# Patient Record
Sex: FEMALE | Race: WHITE | NOT HISPANIC OR LATINO
[De-identification: names, ages, dates, MRNs, and addresses within clinical notes are randomized per-mention and may not be internally consistent; named-entity substitution may affect disease eponyms.]

---

## 2018-06-13 ENCOUNTER — APPOINTMENT (OUTPATIENT)
Dept: ANTEPARTUM | Facility: CLINIC | Age: 38
End: 2018-06-13

## 2018-10-23 ENCOUNTER — INPATIENT (INPATIENT)
Facility: HOSPITAL | Age: 38
LOS: 1 days | Discharge: HOME | End: 2018-10-25
Attending: OBSTETRICS & GYNECOLOGY | Admitting: OBSTETRICS & GYNECOLOGY
Payer: COMMERCIAL

## 2018-10-23 ENCOUNTER — TRANSCRIPTION ENCOUNTER (OUTPATIENT)
Age: 38
End: 2018-10-23

## 2018-10-23 VITALS
RESPIRATION RATE: 20 BRPM | TEMPERATURE: 98 F | HEART RATE: 64 BPM | SYSTOLIC BLOOD PRESSURE: 118 MMHG | DIASTOLIC BLOOD PRESSURE: 66 MMHG

## 2018-10-23 DIAGNOSIS — Z3A.00 WEEKS OF GESTATION OF PREGNANCY NOT SPECIFIED: ICD-10-CM

## 2018-10-23 DIAGNOSIS — O26.899 OTHER SPECIFIED PREGNANCY RELATED CONDITIONS, UNSPECIFIED TRIMESTER: ICD-10-CM

## 2018-10-23 LAB
ANTIBODY ID 1_1: SIGNIFICANT CHANGE UP
BASOPHILS # BLD AUTO: 0.05 K/UL — SIGNIFICANT CHANGE UP (ref 0–0.2)
BASOPHILS NFR BLD AUTO: 0.4 % — SIGNIFICANT CHANGE UP (ref 0–2)
BLD GP AB SCN SERPL QL: POSITIVE — SIGNIFICANT CHANGE UP
DAT POLY-SP REAG RBC QL: NEGATIVE — SIGNIFICANT CHANGE UP
EOSINOPHIL # BLD AUTO: 0.01 K/UL — SIGNIFICANT CHANGE UP (ref 0–0.5)
EOSINOPHIL NFR BLD AUTO: 0.1 % — SIGNIFICANT CHANGE UP (ref 0–6)
HCT VFR BLD CALC: 38.2 % — SIGNIFICANT CHANGE UP (ref 34.5–45)
HGB BLD-MCNC: 12.6 G/DL — SIGNIFICANT CHANGE UP (ref 11.5–15.5)
IMM GRANULOCYTES # BLD AUTO: 0.06 # — SIGNIFICANT CHANGE UP
IMM GRANULOCYTES NFR BLD AUTO: 0.5 % — SIGNIFICANT CHANGE UP (ref 0–1.5)
LYMPHOCYTES # BLD AUTO: 1.01 K/UL — SIGNIFICANT CHANGE UP (ref 1–3.3)
LYMPHOCYTES # BLD AUTO: 8.8 % — LOW (ref 13–44)
MCHC RBC-ENTMCNC: 30 PG — SIGNIFICANT CHANGE UP (ref 27–34)
MCHC RBC-ENTMCNC: 33 % — SIGNIFICANT CHANGE UP (ref 32–36)
MCV RBC AUTO: 91 FL — SIGNIFICANT CHANGE UP (ref 80–100)
MONOCYTES # BLD AUTO: 0.82 K/UL — SIGNIFICANT CHANGE UP (ref 0–0.9)
MONOCYTES NFR BLD AUTO: 7.2 % — SIGNIFICANT CHANGE UP (ref 2–14)
NEUTROPHILS # BLD AUTO: 9.48 K/UL — HIGH (ref 1.8–7.4)
NEUTROPHILS NFR BLD AUTO: 83 % — HIGH (ref 43–77)
NRBC # FLD: 0 — SIGNIFICANT CHANGE UP
PLATELET # BLD AUTO: 289 K/UL — SIGNIFICANT CHANGE UP (ref 150–400)
PMV BLD: 10.6 FL — SIGNIFICANT CHANGE UP (ref 7–13)
RBC # BLD: 4.2 M/UL — SIGNIFICANT CHANGE UP (ref 3.8–5.2)
RBC # FLD: 14.1 % — SIGNIFICANT CHANGE UP (ref 10.3–14.5)
RH IG SCN BLD-IMP: NEGATIVE — SIGNIFICANT CHANGE UP
T PALLIDUM AB TITR SER: NEGATIVE — SIGNIFICANT CHANGE UP
WBC # BLD: 11.43 K/UL — HIGH (ref 3.8–10.5)
WBC # FLD AUTO: 11.43 K/UL — HIGH (ref 3.8–10.5)

## 2018-10-23 PROCEDURE — 86077 PHYS BLOOD BANK SERV XMATCH: CPT

## 2018-10-23 RX ORDER — MAGNESIUM HYDROXIDE 400 MG/1
30 TABLET, CHEWABLE ORAL
Qty: 0 | Refills: 0 | Status: DISCONTINUED | OUTPATIENT
Start: 2018-10-23 | End: 2018-10-25

## 2018-10-23 RX ORDER — PRAMOXINE HYDROCHLORIDE 150 MG/15G
1 AEROSOL, FOAM RECTAL EVERY 4 HOURS
Qty: 0 | Refills: 0 | Status: DISCONTINUED | OUTPATIENT
Start: 2018-10-23 | End: 2018-10-24

## 2018-10-23 RX ORDER — CITRIC ACID/SODIUM CITRATE 300-500 MG
15 SOLUTION, ORAL ORAL EVERY 4 HOURS
Qty: 0 | Refills: 0 | Status: DISCONTINUED | OUTPATIENT
Start: 2018-10-23 | End: 2018-10-23

## 2018-10-23 RX ORDER — ACETAMINOPHEN 500 MG
975 TABLET ORAL EVERY 6 HOURS
Qty: 0 | Refills: 0 | Status: COMPLETED | OUTPATIENT
Start: 2018-10-23 | End: 2019-09-21

## 2018-10-23 RX ORDER — HYDROCORTISONE 1 %
1 OINTMENT (GRAM) TOPICAL EVERY 4 HOURS
Qty: 0 | Refills: 0 | Status: DISCONTINUED | OUTPATIENT
Start: 2018-10-23 | End: 2018-10-24

## 2018-10-23 RX ORDER — PRAMOXINE HYDROCHLORIDE 150 MG/15G
1 AEROSOL, FOAM RECTAL EVERY 4 HOURS
Qty: 0 | Refills: 0 | Status: DISCONTINUED | OUTPATIENT
Start: 2018-10-23 | End: 2018-10-23

## 2018-10-23 RX ORDER — SODIUM CHLORIDE 9 MG/ML
3 INJECTION INTRAMUSCULAR; INTRAVENOUS; SUBCUTANEOUS EVERY 8 HOURS
Qty: 0 | Refills: 0 | Status: DISCONTINUED | OUTPATIENT
Start: 2018-10-23 | End: 2018-10-23

## 2018-10-23 RX ORDER — INFLUENZA VIRUS VACCINE 15; 15; 15; 15 UG/.5ML; UG/.5ML; UG/.5ML; UG/.5ML
0.5 SUSPENSION INTRAMUSCULAR ONCE
Qty: 0 | Refills: 0 | Status: DISCONTINUED | OUTPATIENT
Start: 2018-10-23 | End: 2018-10-25

## 2018-10-23 RX ORDER — OXYCODONE HYDROCHLORIDE 5 MG/1
5 TABLET ORAL EVERY 4 HOURS
Qty: 0 | Refills: 0 | Status: DISCONTINUED | OUTPATIENT
Start: 2018-10-23 | End: 2018-10-25

## 2018-10-23 RX ORDER — OXYCODONE HYDROCHLORIDE 5 MG/1
5 TABLET ORAL
Qty: 0 | Refills: 0 | Status: DISCONTINUED | OUTPATIENT
Start: 2018-10-23 | End: 2018-10-25

## 2018-10-23 RX ORDER — DIBUCAINE 1 %
1 OINTMENT (GRAM) RECTAL EVERY 4 HOURS
Qty: 0 | Refills: 0 | Status: DISCONTINUED | OUTPATIENT
Start: 2018-10-23 | End: 2018-10-23

## 2018-10-23 RX ORDER — OXYTOCIN 10 UNIT/ML
333.3 VIAL (ML) INJECTION
Qty: 20 | Refills: 0 | Status: COMPLETED | OUTPATIENT
Start: 2018-10-23

## 2018-10-23 RX ORDER — DOCUSATE SODIUM 100 MG
100 CAPSULE ORAL
Qty: 0 | Refills: 0 | Status: DISCONTINUED | OUTPATIENT
Start: 2018-10-23 | End: 2018-10-25

## 2018-10-23 RX ORDER — DIBUCAINE 1 %
1 OINTMENT (GRAM) RECTAL EVERY 4 HOURS
Qty: 0 | Refills: 0 | Status: DISCONTINUED | OUTPATIENT
Start: 2018-10-23 | End: 2018-10-25

## 2018-10-23 RX ORDER — GLYCERIN ADULT
1 SUPPOSITORY, RECTAL RECTAL AT BEDTIME
Qty: 0 | Refills: 0 | Status: DISCONTINUED | OUTPATIENT
Start: 2018-10-23 | End: 2018-10-25

## 2018-10-23 RX ORDER — SODIUM CHLORIDE 9 MG/ML
1000 INJECTION, SOLUTION INTRAVENOUS
Qty: 0 | Refills: 0 | Status: DISCONTINUED | OUTPATIENT
Start: 2018-10-23 | End: 2018-10-23

## 2018-10-23 RX ORDER — ACETAMINOPHEN 500 MG
3 TABLET ORAL
Qty: 0 | Refills: 0 | DISCHARGE
Start: 2018-10-23

## 2018-10-23 RX ORDER — OXYTOCIN 10 UNIT/ML
41.67 VIAL (ML) INJECTION
Qty: 20 | Refills: 0 | Status: DISCONTINUED | OUTPATIENT
Start: 2018-10-23 | End: 2018-10-23

## 2018-10-23 RX ORDER — OXYTOCIN 10 UNIT/ML
333.3 VIAL (ML) INJECTION
Qty: 20 | Refills: 0 | Status: COMPLETED | OUTPATIENT
Start: 2018-10-23 | End: 2018-10-23

## 2018-10-23 RX ORDER — LANOLIN
1 OINTMENT (GRAM) TOPICAL EVERY 6 HOURS
Qty: 0 | Refills: 0 | Status: DISCONTINUED | OUTPATIENT
Start: 2018-10-23 | End: 2018-10-25

## 2018-10-23 RX ORDER — ACETAMINOPHEN 500 MG
975 TABLET ORAL EVERY 6 HOURS
Qty: 0 | Refills: 0 | Status: DISCONTINUED | OUTPATIENT
Start: 2018-10-23 | End: 2018-10-25

## 2018-10-23 RX ORDER — IBUPROFEN 200 MG
600 TABLET ORAL EVERY 6 HOURS
Qty: 0 | Refills: 0 | Status: COMPLETED | OUTPATIENT
Start: 2018-10-23 | End: 2019-09-21

## 2018-10-23 RX ORDER — KETOROLAC TROMETHAMINE 30 MG/ML
30 SYRINGE (ML) INJECTION ONCE
Qty: 0 | Refills: 0 | Status: DISCONTINUED | OUTPATIENT
Start: 2018-10-23 | End: 2018-10-23

## 2018-10-23 RX ORDER — SODIUM CHLORIDE 9 MG/ML
3 INJECTION INTRAMUSCULAR; INTRAVENOUS; SUBCUTANEOUS EVERY 8 HOURS
Qty: 0 | Refills: 0 | Status: DISCONTINUED | OUTPATIENT
Start: 2018-10-23 | End: 2018-10-25

## 2018-10-23 RX ORDER — AER TRAVELER 0.5 G/1
1 SOLUTION RECTAL; TOPICAL EVERY 4 HOURS
Qty: 0 | Refills: 0 | Status: DISCONTINUED | OUTPATIENT
Start: 2018-10-23 | End: 2018-10-23

## 2018-10-23 RX ORDER — DIPHENHYDRAMINE HCL 50 MG
25 CAPSULE ORAL EVERY 6 HOURS
Qty: 0 | Refills: 0 | Status: DISCONTINUED | OUTPATIENT
Start: 2018-10-23 | End: 2018-10-25

## 2018-10-23 RX ORDER — SODIUM CHLORIDE 9 MG/ML
1000 INJECTION, SOLUTION INTRAVENOUS ONCE
Qty: 0 | Refills: 0 | Status: DISCONTINUED | OUTPATIENT
Start: 2018-10-23 | End: 2018-10-23

## 2018-10-23 RX ORDER — HYDROCORTISONE 1 %
1 OINTMENT (GRAM) TOPICAL EVERY 4 HOURS
Qty: 0 | Refills: 0 | Status: DISCONTINUED | OUTPATIENT
Start: 2018-10-23 | End: 2018-10-23

## 2018-10-23 RX ORDER — AER TRAVELER 0.5 G/1
1 SOLUTION RECTAL; TOPICAL EVERY 4 HOURS
Qty: 0 | Refills: 0 | Status: DISCONTINUED | OUTPATIENT
Start: 2018-10-23 | End: 2018-10-25

## 2018-10-23 RX ORDER — IBUPROFEN 200 MG
600 TABLET ORAL EVERY 6 HOURS
Qty: 0 | Refills: 0 | Status: DISCONTINUED | OUTPATIENT
Start: 2018-10-23 | End: 2018-10-25

## 2018-10-23 RX ORDER — TETANUS TOXOID, REDUCED DIPHTHERIA TOXOID AND ACELLULAR PERTUSSIS VACCINE, ADSORBED 5; 2.5; 8; 8; 2.5 [IU]/.5ML; [IU]/.5ML; UG/.5ML; UG/.5ML; UG/.5ML
0.5 SUSPENSION INTRAMUSCULAR ONCE
Qty: 0 | Refills: 0 | Status: DISCONTINUED | OUTPATIENT
Start: 2018-10-23 | End: 2018-10-25

## 2018-10-23 RX ORDER — SIMETHICONE 80 MG/1
80 TABLET, CHEWABLE ORAL EVERY 6 HOURS
Qty: 0 | Refills: 0 | Status: DISCONTINUED | OUTPATIENT
Start: 2018-10-23 | End: 2018-10-25

## 2018-10-23 RX ADMIN — Medication 975 MILLIGRAM(S): at 22:00

## 2018-10-23 RX ADMIN — Medication 600 MILLIGRAM(S): at 22:00

## 2018-10-23 RX ADMIN — Medication 999.9 MILLIUNIT(S)/MIN: at 15:03

## 2018-10-23 RX ADMIN — Medication 975 MILLIGRAM(S): at 21:00

## 2018-10-23 RX ADMIN — Medication 125 MILLIUNIT(S)/MIN: at 15:03

## 2018-10-23 RX ADMIN — Medication 975 MILLIGRAM(S): at 15:10

## 2018-10-23 RX ADMIN — Medication 600 MILLIGRAM(S): at 21:00

## 2018-10-23 RX ADMIN — SODIUM CHLORIDE 3 MILLILITER(S): 9 INJECTION INTRAMUSCULAR; INTRAVENOUS; SUBCUTANEOUS at 21:39

## 2018-10-23 RX ADMIN — Medication 30 MILLIGRAM(S): at 14:01

## 2018-10-23 NOTE — DISCHARGE NOTE OB - MEDICATION SUMMARY - MEDICATIONS TO TAKE
I will START or STAY ON the medications listed below when I get home from the hospital:    ibuprofen 600 mg oral tablet  -- 1 tab(s) by mouth every 6 hours, As needed, Moderate Pain  -- Indication: For pain    acetaminophen 325 mg oral tablet  -- 3 tab(s) by mouth every 6 hours  -- Indication: For pain

## 2018-10-23 NOTE — DISCHARGE NOTE OB - PATIENT PORTAL LINK FT
You can access the [email or web address] Patient [facility], offered by [facility], by registering with the following website: [email or web address]

## 2018-10-23 NOTE — DISCHARGE NOTE OB - CARE PLAN
Goal:	delivery  Assessment and plan of treatment:	f/u in 6 wks Principal Discharge DX:	Vaginal delivery  Goal:	delivery  Assessment and plan of treatment:	f/u in 6 wks

## 2018-10-24 LAB — RBC # BLD FETUS AUTO: 0 — SIGNIFICANT CHANGE UP

## 2018-10-24 RX ORDER — PRAMOXINE HYDROCHLORIDE 150 MG/15G
1 AEROSOL, FOAM RECTAL EVERY 4 HOURS
Qty: 0 | Refills: 0 | Status: DISCONTINUED | OUTPATIENT
Start: 2018-10-24 | End: 2018-10-25

## 2018-10-24 RX ORDER — VALACYCLOVIR 500 MG/1
2000 TABLET, FILM COATED ORAL EVERY 12 HOURS
Qty: 0 | Refills: 0 | Status: COMPLETED | OUTPATIENT
Start: 2018-10-24 | End: 2018-10-25

## 2018-10-24 RX ORDER — HYDROCORTISONE 1 %
1 OINTMENT (GRAM) TOPICAL EVERY 4 HOURS
Qty: 0 | Refills: 0 | Status: DISCONTINUED | OUTPATIENT
Start: 2018-10-24 | End: 2018-10-25

## 2018-10-24 RX ADMIN — Medication 100 MILLIGRAM(S): at 16:38

## 2018-10-24 RX ADMIN — Medication 975 MILLIGRAM(S): at 15:29

## 2018-10-24 RX ADMIN — Medication 975 MILLIGRAM(S): at 22:49

## 2018-10-24 RX ADMIN — Medication 975 MILLIGRAM(S): at 23:49

## 2018-10-24 RX ADMIN — Medication 975 MILLIGRAM(S): at 09:30

## 2018-10-24 RX ADMIN — Medication 975 MILLIGRAM(S): at 13:53

## 2018-10-24 RX ADMIN — Medication 975 MILLIGRAM(S): at 08:30

## 2018-10-24 RX ADMIN — VALACYCLOVIR 2000 MILLIGRAM(S): 500 TABLET, FILM COATED ORAL at 16:37

## 2018-10-24 RX ADMIN — SODIUM CHLORIDE 3 MILLILITER(S): 9 INJECTION INTRAMUSCULAR; INTRAVENOUS; SUBCUTANEOUS at 04:35

## 2018-10-24 RX ADMIN — Medication 600 MILLIGRAM(S): at 23:49

## 2018-10-24 RX ADMIN — Medication 1 APPLICATION(S): at 22:49

## 2018-10-24 RX ADMIN — Medication 600 MILLIGRAM(S): at 03:37

## 2018-10-24 RX ADMIN — Medication 975 MILLIGRAM(S): at 03:37

## 2018-10-24 RX ADMIN — Medication 600 MILLIGRAM(S): at 15:29

## 2018-10-24 RX ADMIN — Medication 600 MILLIGRAM(S): at 08:30

## 2018-10-24 RX ADMIN — Medication 1 APPLICATION(S): at 16:40

## 2018-10-24 RX ADMIN — Medication 975 MILLIGRAM(S): at 02:37

## 2018-10-24 RX ADMIN — Medication 600 MILLIGRAM(S): at 02:37

## 2018-10-24 RX ADMIN — Medication 600 MILLIGRAM(S): at 09:30

## 2018-10-24 RX ADMIN — Medication 600 MILLIGRAM(S): at 22:49

## 2018-10-24 RX ADMIN — Medication 600 MILLIGRAM(S): at 13:54

## 2018-10-24 NOTE — LACTATION INITIAL EVALUATION - INTERVENTION OUTCOME
verbalizes understanding/demonstrates understanding of teaching/good return demonstration/reviewed wet and soiled diaper log, feeding cues, feeding on demand, supply and demand, safe sleep practices, manual expression of colostrum;  encouraged to ask for assistance with breastfeeding as needed

## 2018-10-24 NOTE — LACTATION INITIAL EVALUATION - LACTATION INTERVENTIONS
initiate skin to skin/assisted to put baby to rt. breast in football hold position with deep latch and sucking with stimulation;  importance of deep latch stressed

## 2018-10-25 VITALS
RESPIRATION RATE: 17 BRPM | SYSTOLIC BLOOD PRESSURE: 135 MMHG | TEMPERATURE: 98 F | DIASTOLIC BLOOD PRESSURE: 81 MMHG | HEART RATE: 63 BPM | OXYGEN SATURATION: 98 %

## 2018-10-25 RX ADMIN — Medication 975 MILLIGRAM(S): at 06:01

## 2018-10-25 RX ADMIN — Medication 975 MILLIGRAM(S): at 13:05

## 2018-10-25 RX ADMIN — Medication 600 MILLIGRAM(S): at 13:05

## 2018-10-25 RX ADMIN — VALACYCLOVIR 2000 MILLIGRAM(S): 500 TABLET, FILM COATED ORAL at 06:01

## 2018-10-25 RX ADMIN — Medication 600 MILLIGRAM(S): at 13:35

## 2018-10-25 RX ADMIN — Medication 975 MILLIGRAM(S): at 07:01

## 2018-10-25 RX ADMIN — Medication 600 MILLIGRAM(S): at 06:01

## 2018-10-25 RX ADMIN — Medication 600 MILLIGRAM(S): at 07:00

## 2018-10-25 RX ADMIN — Medication 975 MILLIGRAM(S): at 13:35

## 2018-12-03 ENCOUNTER — APPOINTMENT (OUTPATIENT)
Dept: UROGYNECOLOGY | Facility: CLINIC | Age: 38
End: 2018-12-03
Payer: COMMERCIAL

## 2018-12-03 VITALS — DIASTOLIC BLOOD PRESSURE: 78 MMHG | HEIGHT: 66 IN | SYSTOLIC BLOOD PRESSURE: 120 MMHG

## 2018-12-03 DIAGNOSIS — R35.0 FREQUENCY OF MICTURITION: ICD-10-CM

## 2018-12-03 DIAGNOSIS — R35.1 NOCTURIA: ICD-10-CM

## 2018-12-03 DIAGNOSIS — R39.15 URGENCY OF URINATION: ICD-10-CM

## 2018-12-03 LAB
BILIRUB UR QL STRIP: NORMAL
CLARITY UR: CLEAR
COLLECTION METHOD: NORMAL
GLUCOSE UR-MCNC: NORMAL
HCG UR QL: 0.2 EU/DL
HGB UR QL STRIP.AUTO: NORMAL
KETONES UR-MCNC: NORMAL
LEUKOCYTE ESTERASE UR QL STRIP: NORMAL
NITRITE UR QL STRIP: NORMAL
PH UR STRIP: 7.5
PROT UR STRIP-MCNC: NORMAL
SP GR UR STRIP: 1.01

## 2018-12-03 PROCEDURE — 99203 OFFICE O/P NEW LOW 30 MIN: CPT

## 2018-12-03 PROCEDURE — 81003 URINALYSIS AUTO W/O SCOPE: CPT | Mod: QW

## 2019-01-03 ENCOUNTER — APPOINTMENT (OUTPATIENT)
Dept: UROGYNECOLOGY | Facility: CLINIC | Age: 39
End: 2019-01-03

## 2019-08-19 ENCOUNTER — RESULT REVIEW (OUTPATIENT)
Age: 39
End: 2019-08-19

## 2020-09-12 NOTE — PATIENT PROFILE OB - AMNIOTIC FLUID ODOR, LABOR
pt received into spot C A&Ox3 ambulatory appears comfortable arrives via walk in triage with family for eval of right hand/ wrist pain s/p [place] the park prior to [place]. Denies head trauma/ LOC no vomiting or other injuries. Appears slightly swollen but has ROM
normal

## 2020-11-30 ENCOUNTER — APPOINTMENT (OUTPATIENT)
Dept: SPINE | Facility: CLINIC | Age: 40
End: 2020-11-30
Payer: COMMERCIAL

## 2020-11-30 VITALS
TEMPERATURE: 98 F | SYSTOLIC BLOOD PRESSURE: 126 MMHG | WEIGHT: 150 LBS | HEIGHT: 66 IN | DIASTOLIC BLOOD PRESSURE: 75 MMHG | BODY MASS INDEX: 24.11 KG/M2

## 2020-11-30 PROCEDURE — 99203 OFFICE O/P NEW LOW 30 MIN: CPT

## 2020-11-30 RX ORDER — OMEGA-3/DHA/EPA/FISH OIL 300-1000MG
CAPSULE ORAL
Refills: 0 | Status: ACTIVE | COMMUNITY

## 2020-11-30 RX ORDER — CHOLECALCIFEROL (VITAMIN D3) 25 MCG
TABLET ORAL
Refills: 0 | Status: ACTIVE | COMMUNITY

## 2020-11-30 RX ORDER — METHENAM/BENZ AC/SALICY/SAL-AM
TABLET ORAL
Refills: 0 | Status: DISCONTINUED | COMMUNITY
End: 2020-11-30

## 2020-11-30 RX ORDER — ASCORBIC ACID 500 MG
TABLET ORAL
Refills: 0 | Status: ACTIVE | COMMUNITY

## 2020-11-30 RX ORDER — FLUOXETINE HYDROCHLORIDE 60 MG/1
TABLET ORAL
Refills: 0 | Status: ACTIVE | COMMUNITY

## 2020-11-30 RX ORDER — VALACYCLOVIR 500 MG/1
TABLET, FILM COATED ORAL
Refills: 0 | Status: ACTIVE | COMMUNITY

## 2020-11-30 NOTE — PHYSICAL EXAM
[Person] : oriented to person [Place] : oriented to place [Time] : oriented to time [Short Term Intact] : short term memory intact [Remote Intact] : remote memory intact [Span Intact] : the attention span was normal [Concentration Intact] : normal concentrating ability [Fluency] : fluency intact [Comprehension] : comprehension intact [Current Events] : adequate knowledge of current events [Past History] : adequate knowledge of personal past history [Vocabulary] : adequate range of vocabulary [Cranial Nerves Optic (II)] : visual acuity intact bilaterally,  pupils equal round and reactive to light [Cranial Nerves Oculomotor (III)] : extraocular motion intact [Cranial Nerves Trigeminal (V)] : facial sensation intact symmetrically [Cranial Nerves Facial (VII)] : face symmetrical [Cranial Nerves Vestibulocochlear (VIII)] : hearing was intact bilaterally [Cranial Nerves Glossopharyngeal (IX)] : tongue and palate midline [Cranial Nerves Accessory (XI - Cranial And Spinal)] : head turning and shoulder shrug symmetric [Cranial Nerves Hypoglossal (XII)] : there was no tongue deviation with protrusion [Motor Tone] : muscle tone was normal in all four extremities [Motor Strength] : muscle strength was normal in all four extremities [No Muscle Atrophy] : normal bulk in all four extremities [Sensation Tactile Decrease] : light touch was intact [Abnormal Walk] : normal gait [Balance] : balance was intact [Past-pointing] : there was no past-pointing [Tremor] : no tremor present [2+] : Ankle jerk left 2+ [Plantar Reflex Right Only] : normal on the right [Plantar Reflex Left Only] : normal on the left [___] : absent on the right [___] : absent on the left [Guillen] : Guillen's sign was not demonstrated [No Tenderness to Palpation] : no spine tenderness on palpation [Full ROM] : full ROM [L'Hermitte's] : neck flexion did not produce tingling down the spine/arms [Spurling's - Opposite Side] : Negative Spurling's on opposite side [Spurling's Same Side] : Negative Spurling's on same side

## 2020-11-30 NOTE — HISTORY OF PRESENT ILLNESS
[< 3 months] : less than 3 months [FreeTextEntry1] : Right sided radiculopathy  [de-identified] : this is a pleasant 40-year-old female with a long standing history of neck pain that was intermittent until about 2.5 months ago when she began to have right arm pain and neck pain .  the pain is reported at 8/10 and constant.  Her pain is in the shoulder as well.  The pain is tingling and numbness with all five fingers numb. N She is reporting weakness of her right arm.   No urine or stool incontinence.  She has taken Advil during the month with no relief.  Positive difficulty sleeping.  On MRI there is a disc herniation of C 6 C 7 and osteophytes at C 4-C6. .  The MRI scan did not perform any axial T2 imaging. [Pain] : pain [Weakness] : weakness [Numbness] : numbness [Stable] : stable [___ mths] : [unfilled] month(s) ago [7] : currently ~his/her~ pain is 7 out of 10

## 2020-11-30 NOTE — REVIEW OF SYSTEMS
[Arm Weakness] : arm weakness [Numbness] : numbness [Tingling] : tingling [Negative] : Heme/Lymph [de-identified] : neck and right arm pain

## 2020-11-30 NOTE — ASSESSMENT
[FreeTextEntry1] : Right sided cervical radiculopathy and paracentral disc herniations of C 6 C 7 and osteophytes at C 3 C 4 .    The MRI  has no axial images to make a determination of compression and [facility] radiology will be contacted.   A course of PT was recommended.   Alternative measures such as acupuncture and  epidural injections were suggested.  Chiropractor therapy was not recommended.  She will follow up if her pain is unrelenting.  In the meantime the MRI disc will be resent or repeated.  Telehealth can be scheduled for her MRI review.   \par \par \par Dr. Swenson \[facility] [address] [facility] \[place] [address]

## 2020-11-30 NOTE — CONSULT LETTER
[Dear  ___] : Dear  [unfilled], [Consult Letter:] : I had the pleasure of evaluating your patient, [unfilled]. [Please see my note below.] : Please see my note below. [Consult Closing:] : Thank you very much for allowing me to participate in the care of this patient.  If you have any questions, please do not hesitate to contact me. [Sincerely,] : Sincerely, [FreeTextEntry3] : Thor Guillen MD\par Chief of Neurosurgery [facility]\par [facility]\par

## 2020-11-30 NOTE — REASON FOR VISIT
[New Patient Visit] : a new patient visit [Other: _____] : [unfilled] [Referred By: _________] : Patient was referred by LYLY [FreeTextEntry1] : Cervical radiculopathy

## 2020-12-21 ENCOUNTER — APPOINTMENT (OUTPATIENT)
Dept: SPINE | Facility: CLINIC | Age: 40
End: 2020-12-21
Payer: COMMERCIAL

## 2020-12-21 VITALS
HEIGHT: 66 IN | WEIGHT: 150 LBS | SYSTOLIC BLOOD PRESSURE: 123 MMHG | HEART RATE: 69 BPM | OXYGEN SATURATION: 99 % | DIASTOLIC BLOOD PRESSURE: 81 MMHG | TEMPERATURE: 97.6 F | BODY MASS INDEX: 24.11 KG/M2

## 2020-12-21 PROCEDURE — 99213 OFFICE O/P EST LOW 20 MIN: CPT

## 2020-12-21 PROCEDURE — 99072 ADDL SUPL MATRL&STAF TM PHE: CPT

## 2020-12-21 NOTE — ASSESSMENT
[FreeTextEntry1] : It was discussed that the patient has herniated discs at C5-C6 and C6-C7.  Surgery for a C5-C6 and C6-C7 TDR was discussed with the patient.  The patient will call if she decides to have surgery.  She was  advised to have cervical AP and lateral x rays and a CT scan if she decides to have surgery.

## 2020-12-21 NOTE — REASON FOR VISIT
[Follow-Up: _____] : a [unfilled] follow-up visit [FreeTextEntry1] : SIERRA HOUSTON is a 40 year old lady who has been neck pain with pain, tingling and numbness down her right arm which started 2 months ago.  She state that her pain today is an 8 on a scale from 0-10.  She stated that the pain is better than when she was last here but she has more numbness in the right arm.  She stated that she has difficulty holding and taking care of her baby.MRI scan shows a large right C6-7 disc herniation and a smaller right C5-6 disc herniation. Her pain is significantly adversely affecting her lifestyle. She is also noting significant right arm and hand weakness.\par

## 2020-12-21 NOTE — PHYSICAL EXAM
[Sensation Tactile Decrease] : light touch was intact [Abnormal Walk] : normal gait [FreeTextEntry1] : The patient is alert and oriented to person, place and time.  Higher cortical functions are intact. Face is symmetrical and speech is clear and fluent. The patient has weakness with the  on the right side. Gait is normal.\par  [FreeTextEntry6] : right bicep, tricep and weakness 4/5

## 2020-12-28 ENCOUNTER — NON-APPOINTMENT (OUTPATIENT)
Age: 40
End: 2020-12-28

## 2021-01-05 ENCOUNTER — APPOINTMENT (OUTPATIENT)
Dept: RADIOLOGY | Facility: IMAGING CENTER | Age: 41
End: 2021-01-05

## 2021-01-05 ENCOUNTER — APPOINTMENT (OUTPATIENT)
Dept: CT IMAGING | Facility: IMAGING CENTER | Age: 41
End: 2021-01-05

## 2021-01-05 ENCOUNTER — OUTPATIENT (OUTPATIENT)
Dept: OUTPATIENT SERVICES | Facility: HOSPITAL | Age: 41
LOS: 1 days | End: 2021-01-05
Payer: COMMERCIAL

## 2021-01-05 DIAGNOSIS — M50.20 OTHER CERVICAL DISC DISPLACEMENT, UNSPECIFIED CERVICAL REGION: ICD-10-CM

## 2021-01-05 PROCEDURE — 72052 X-RAY EXAM NECK SPINE 6/>VWS: CPT

## 2021-01-05 PROCEDURE — 72125 CT NECK SPINE W/O DYE: CPT

## 2021-01-05 PROCEDURE — 72125 CT NECK SPINE W/O DYE: CPT | Mod: 26

## 2021-01-05 PROCEDURE — 72052 X-RAY EXAM NECK SPINE 6/>VWS: CPT | Mod: 26

## 2021-01-06 ENCOUNTER — NON-APPOINTMENT (OUTPATIENT)
Age: 41
End: 2021-01-06

## 2021-01-15 ENCOUNTER — OUTPATIENT (OUTPATIENT)
Dept: OUTPATIENT SERVICES | Facility: HOSPITAL | Age: 41
LOS: 1 days | End: 2021-01-15
Payer: COMMERCIAL

## 2021-01-15 VITALS
SYSTOLIC BLOOD PRESSURE: 106 MMHG | RESPIRATION RATE: 16 BRPM | WEIGHT: 145.95 LBS | HEIGHT: 66 IN | HEART RATE: 70 BPM | DIASTOLIC BLOOD PRESSURE: 69 MMHG | TEMPERATURE: 98 F | OXYGEN SATURATION: 99 %

## 2021-01-15 DIAGNOSIS — M54.12 RADICULOPATHY, CERVICAL REGION: ICD-10-CM

## 2021-01-15 DIAGNOSIS — Z29.9 ENCOUNTER FOR PROPHYLACTIC MEASURES, UNSPECIFIED: ICD-10-CM

## 2021-01-15 DIAGNOSIS — Z01.818 ENCOUNTER FOR OTHER PREPROCEDURAL EXAMINATION: ICD-10-CM

## 2021-01-15 DIAGNOSIS — Z98.890 OTHER SPECIFIED POSTPROCEDURAL STATES: Chronic | ICD-10-CM

## 2021-01-15 DIAGNOSIS — Z98.890 OTHER SPECIFIED POSTPROCEDURAL STATES: ICD-10-CM

## 2021-01-15 LAB
ANION GAP SERPL CALC-SCNC: 12 MMOL/L — SIGNIFICANT CHANGE UP (ref 5–17)
BUN SERPL-MCNC: 21 MG/DL — SIGNIFICANT CHANGE UP (ref 7–23)
CALCIUM SERPL-MCNC: 9.6 MG/DL — SIGNIFICANT CHANGE UP (ref 8.4–10.5)
CHLORIDE SERPL-SCNC: 102 MMOL/L — SIGNIFICANT CHANGE UP (ref 96–108)
CO2 SERPL-SCNC: 24 MMOL/L — SIGNIFICANT CHANGE UP (ref 22–31)
CREAT SERPL-MCNC: 0.85 MG/DL — SIGNIFICANT CHANGE UP (ref 0.5–1.3)
GLUCOSE SERPL-MCNC: 81 MG/DL — SIGNIFICANT CHANGE UP (ref 70–99)
HCT VFR BLD CALC: 40.1 % — SIGNIFICANT CHANGE UP (ref 34.5–45)
HGB BLD-MCNC: 13.4 G/DL — SIGNIFICANT CHANGE UP (ref 11.5–15.5)
MCHC RBC-ENTMCNC: 30.5 PG — SIGNIFICANT CHANGE UP (ref 27–34)
MCHC RBC-ENTMCNC: 33.4 GM/DL — SIGNIFICANT CHANGE UP (ref 32–36)
MCV RBC AUTO: 91.1 FL — SIGNIFICANT CHANGE UP (ref 80–100)
NRBC # BLD: 0 /100 WBCS — SIGNIFICANT CHANGE UP (ref 0–0)
PLATELET # BLD AUTO: 372 K/UL — SIGNIFICANT CHANGE UP (ref 150–400)
POTASSIUM SERPL-MCNC: 4.1 MMOL/L — SIGNIFICANT CHANGE UP (ref 3.5–5.3)
POTASSIUM SERPL-SCNC: 4.1 MMOL/L — SIGNIFICANT CHANGE UP (ref 3.5–5.3)
RBC # BLD: 4.4 M/UL — SIGNIFICANT CHANGE UP (ref 3.8–5.2)
RBC # FLD: 13.2 % — SIGNIFICANT CHANGE UP (ref 10.3–14.5)
SODIUM SERPL-SCNC: 138 MMOL/L — SIGNIFICANT CHANGE UP (ref 135–145)
WBC # BLD: 7.62 K/UL — SIGNIFICANT CHANGE UP (ref 3.8–10.5)
WBC # FLD AUTO: 7.62 K/UL — SIGNIFICANT CHANGE UP (ref 3.8–10.5)

## 2021-01-15 PROCEDURE — 86850 RBC ANTIBODY SCREEN: CPT

## 2021-01-15 PROCEDURE — G0463: CPT

## 2021-01-15 PROCEDURE — 86900 BLOOD TYPING SEROLOGIC ABO: CPT

## 2021-01-15 PROCEDURE — 86901 BLOOD TYPING SEROLOGIC RH(D): CPT

## 2021-01-15 NOTE — H&P PST ADULT - NSANTHOSAYNRD_GEN_A_CORE
No. ZEINA screening performed.  STOP BANG Legend: 0-2 = LOW Risk; 3-4 = INTERMEDIATE Risk; 5-8 = HIGH Risk

## 2021-01-15 NOTE — H&P PST ADULT - ASSESSMENT
[place]IAI VTE 2.0 SCORE [CLOT updated 2019]    AGE RELATED RISK FACTORS                                                       MOBILITY RELATED FACTORS  [x ] Age 41-60 years                                            (1 Point)                    [ ] Bed rest                                                        (1 Point)  [ ] Age: 61-74 years                                           (2 Points)                  [ ] Plaster cast                                                   (2 Points)  [ ] Age= 75 years                                              (3 Points)                    [ ] Bed bound for more than 72 hours                 (2 Points)    DISEASE RELATED RISK FACTORS                                               GENDER SPECIFIC FACTORS  [ ] Edema in the lower extremities                       (1 Point)              [ ] Pregnancy                                                     (1 Point)  [ ] Varicose veins                                               (1 Point)                     [ ] Post-partum < 6 weeks                                   (1 Point)             [ ] BMI > 25 Kg/m2                                            (1 Point)                     [ ] Hormonal therapy  or oral contraception          (1 Point)                 [ ] Sepsis (in the previous month)                        (1 Point)               [ ] History of pregnancy complications                 (1 point)  [ ] Pneumonia or serious lung disease                                               [ ] Unexplained or recurrent                     (1 Point)           (in the previous month)                               (1 Point)  [ ] Abnormal pulmonary function test                     (1 Point)                 SURGERY RELATED RISK FACTORS  [ ] Acute myocardial infarction                              (1 Point)               [ ]  Section                                             (1 Point)  [ ] Congestive heart failure (in the previous month)  (1 Point)      [ ] Minor surgery                                                  (1 Point)   [ ] Inflammatory bowel disease                             (1 Point)               [ ] Arthroscopic surgery                                        (2 Points)  [ ] Central venous access                                      (2 Points)                [x ] General surgery lasting more than 45 minutes (2 points)  [ ] Malignancy- Present or previous                   (2 Points)                [ ] Elective arthroplasty                                         (5 points)    [ ] Stroke (in the previous month)                          (5 Points)                                                                                                                                                           HEMATOLOGY RELATED FACTORS                                                 TRAUMA RELATED RISK FACTORS  [ ] Prior episodes of VTE                                     (3 Points)                [ ] Fracture of the hip, pelvis, or leg                       (5 Points)  [ ] Positive family history for VTE                         (3 Points)             [ ] Acute spinal cord injury (in the previous month)  (5 Points)  [ ] Prothrombin 06121 A                                     (3 Points)               [ ] Paralysis  (less than 1 month)                             (5 Points)  [ ] Factor V Leiden                                             (3 Points)                  [ ] Multiple Trauma within 1 month                        (5 Points)  [ ] Lupus anticoagulants                                     (3 Points)                                                           [ ] Anticardiolipin antibodies                               (3 Points)                                                       [ ] High homocysteine in the blood                      (3 Points)                                             [ ] Other congenital or acquired thrombophilia      (3 Points)                                                [ ] Heparin induced thrombocytopenia                  (3 Points)                                     Total Score [    3      ]

## 2021-01-15 NOTE — H&P PST ADULT - NSICDXPASTSURGICALHX_GEN_ALL_CORE_FT
PAST SURGICAL HISTORY:  No significant past surgical history      PAST SURGICAL HISTORY:  H/O oral surgery     Status post anal fissurectomy 2019

## 2021-01-15 NOTE — H&P PST ADULT - NSICDXPASTMEDICALHX_GEN_ALL_CORE_FT
PAST MEDICAL HISTORY:  Cervical herniated disc     Cervical radiculopathy     Depression     No pertinent past medical history      PAST MEDICAL HISTORY:  Cervical herniated disc     Cervical radiculopathy     Depression      PAST MEDICAL HISTORY:  Cervical herniated disc     Cervical radiculopathy     Depression     History of recent dental procedure Root canal on 1/12/21

## 2021-01-15 NOTE — H&P PST ADULT - HISTORY OF PRESENT ILLNESS
41 y/o female with history of cervical radiculopathy presents today for presurgical evaluation.  She is scheduled for C5-6 and C6-7 anterior total disc replacement on 1/29/21.    Preop Covid swab is scheduled for 1/26/21 at [facility] testing site.  39 y/o female with history of cervical radiculopathy presents today for presurgical evaluation.  She reports history of chronic intermittent neck pain, however she reports that the neck pain and right arm, pain, numbness, tingling and weakness have worsened over the past 2.5 months.  She is scheduled for C5-6 and C6-7 anterior total disc replacement on 1/29/21.    Preop Covid swab is scheduled for 1/26/21 at [facility] testing site.

## 2021-01-15 NOTE — H&P PST ADULT - NSICDXPROBLEM_GEN_ALL_CORE_FT
PROBLEM DIAGNOSES  Problem: Cervical radiculopathy  Assessment and Plan: Pt. is scheduled for C5-6, C6-7 anterior total disc replacement on 1/29/21.  Preop instructions reviewed, pt verbalized understanding.  Preop labs drawn today (CBC, BMP, T&S) and MRSA swab done today.  Preop Covid swab scheduled for 1/26/20.        PROBLEM DIAGNOSES  Problem: Need for prophylactic measure  Assessment and Plan: The Caprini score indicates this patient is at risk for a VTE event (score 3-5).  Most surgical patients in this group would benefit from pharmacologic prophylaxis.  The surgical team will determine the balance between VTE risk and bleeding risk     Problem: Cervical radiculopathy  Assessment and Plan: Pt. is scheduled for C5-6, C6-7 anterior total disc replacement on 1/29/21.  Preop instructions reviewed, pt verbalized understanding.  Preop labs drawn today (CBC, BMP, T&S) and MRSA swab done today.  Preop Covid swab scheduled for 1/26/20.   Pt. instructed to obtain medical clearance by surgeon.       PROBLEM DIAGNOSES  Problem: History of recent dental procedure  Assessment and Plan: Dr. Guillen notified that patient had recent dental surgery/root canal on 1/12/21.    Problem: Need for prophylactic measure  Assessment and Plan: The Caprini score indicates this patient is at risk for a VTE event (score 3-5).  Most surgical patients in this group would benefit from pharmacologic prophylaxis.  The surgical team will determine the balance between VTE risk and bleeding risk     Problem: Cervical radiculopathy  Assessment and Plan: Pt. is scheduled for C5-6, C6-7 anterior total disc replacement on 1/29/21.  Preop instructions reviewed, pt verbalized understanding.  Preop labs drawn today (CBC, BMP, T&S) and MRSA swab done today.  Preop Covid swab scheduled for 1/26/20.   Pt. instructed to obtain medical clearance by surgeon.       PROBLEM DIAGNOSES  Problem: History of recent dental procedure  Assessment and Plan: Dr. Guillen notified that patient had an emergency root canal performed on 1/12/21 and will follow up with her dentist next week.     Problem: Need for prophylactic measure  Assessment and Plan: The Caprini score indicates this patient is at risk for a VTE event (score 3-5).  Most surgical patients in this group would benefit from pharmacologic prophylaxis.  The surgical team will determine the balance between VTE risk and bleeding risk     Problem: Cervical radiculopathy  Assessment and Plan: Pt. is scheduled for C5-6, C6-7 anterior total disc replacement on 1/29/21.  Preop instructions reviewed, pt verbalized understanding.  Preop labs drawn today (CBC, BMP, T&S) and MRSA swab done today.  Preop Covid swab scheduled for 1/26/20.   Pt. instructed to obtain medical clearance by surgeon.

## 2021-01-16 LAB
MRSA PCR RESULT.: SIGNIFICANT CHANGE UP
S AUREUS DNA NOSE QL NAA+PROBE: SIGNIFICANT CHANGE UP

## 2021-01-25 ENCOUNTER — APPOINTMENT (OUTPATIENT)
Dept: SPINE | Facility: CLINIC | Age: 41
End: 2021-01-25
Payer: COMMERCIAL

## 2021-01-25 VITALS
OXYGEN SATURATION: 100 % | SYSTOLIC BLOOD PRESSURE: 122 MMHG | BODY MASS INDEX: 23.95 KG/M2 | HEART RATE: 76 BPM | DIASTOLIC BLOOD PRESSURE: 79 MMHG | TEMPERATURE: 97.7 F | HEIGHT: 66 IN | WEIGHT: 149 LBS

## 2021-01-25 DIAGNOSIS — Z82.49 FAMILY HISTORY OF ISCHEMIC HEART DISEASE AND OTHER DISEASES OF THE CIRCULATORY SYSTEM: ICD-10-CM

## 2021-01-25 DIAGNOSIS — M54.12 RADICULOPATHY, CERVICAL REGION: ICD-10-CM

## 2021-01-25 DIAGNOSIS — Z83.42 FAMILY HISTORY OF FAMILIAL HYPERCHOLESTEROLEMIA: ICD-10-CM

## 2021-01-25 DIAGNOSIS — Z81.8 FAMILY HISTORY OF OTHER MENTAL AND BEHAVIORAL DISORDERS: ICD-10-CM

## 2021-01-25 DIAGNOSIS — Z80.3 FAMILY HISTORY OF MALIGNANT NEOPLASM OF BREAST: ICD-10-CM

## 2021-01-25 DIAGNOSIS — M50.20 OTHER CERVICAL DISC DISPLACEMENT, UNSPECIFIED CERVICAL REGION: ICD-10-CM

## 2021-01-25 PROBLEM — Z98.890 OTHER SPECIFIED POSTPROCEDURAL STATES: Chronic | Status: ACTIVE | Noted: 2021-01-15

## 2021-01-25 PROBLEM — F32.9 MAJOR DEPRESSIVE DISORDER, SINGLE EPISODE, UNSPECIFIED: Chronic | Status: ACTIVE | Noted: 2021-01-15

## 2021-01-25 PROCEDURE — 99072 ADDL SUPL MATRL&STAF TM PHE: CPT

## 2021-01-25 PROCEDURE — 99212 OFFICE O/P EST SF 10 MIN: CPT

## 2021-01-26 ENCOUNTER — OUTPATIENT (OUTPATIENT)
Dept: OUTPATIENT SERVICES | Facility: HOSPITAL | Age: 41
LOS: 1 days | End: 2021-01-26
Payer: COMMERCIAL

## 2021-01-26 DIAGNOSIS — Z11.52 ENCOUNTER FOR SCREENING FOR COVID-19: ICD-10-CM

## 2021-01-26 DIAGNOSIS — Z98.890 OTHER SPECIFIED POSTPROCEDURAL STATES: Chronic | ICD-10-CM

## 2021-01-26 PROBLEM — Z82.49 FAMILY HISTORY OF CARDIAC DISORDER: Status: ACTIVE | Noted: 2018-12-03

## 2021-01-26 PROBLEM — Z83.42 FAMILY HISTORY OF HYPERCHOLESTEROLEMIA: Status: ACTIVE | Noted: 2018-12-03

## 2021-01-26 PROBLEM — Z81.8 FAMILY HISTORY OF DEPRESSION: Status: ACTIVE | Noted: 2018-12-03

## 2021-01-26 PROBLEM — Z80.3 FAMILY HISTORY OF MALIGNANT NEOPLASM OF BREAST: Status: ACTIVE | Noted: 2018-12-03

## 2021-01-26 PROBLEM — Z82.49 FAMILY HISTORY OF HYPERTENSION: Status: ACTIVE | Noted: 2018-12-03

## 2021-01-26 LAB — SARS-COV-2 RNA SPEC QL NAA+PROBE: SIGNIFICANT CHANGE UP

## 2021-01-26 PROCEDURE — U0005: CPT

## 2021-01-26 PROCEDURE — U0003: CPT

## 2021-01-26 PROCEDURE — C9803: CPT

## 2021-01-26 NOTE — ASSESSMENT
[FreeTextEntry1] : I had a detailed discussion with her the she may need to have an ACDF at C5-6 rather than total disc replacement. This will be determined at the time of surgery. She is also scheduled for a total disc replacement at C6-7.

## 2021-01-26 NOTE — REASON FOR VISIT
[Follow-Up: _____] : a [unfilled] follow-up visit [Other: _____] : [unfilled] [FreeTextEntry1] : This is a 40 year old female with cervical radiculopathy and an ACDF was recommended.  She is here to discuss the surgery and review imaged preoperatively.  Flexion-extension x-rays and a CT scan showed a C 5-6 disc spaces somewhat narrowed and may not accommodate a TDR. C 6- C 7 should be fine.

## 2021-01-28 ENCOUNTER — TRANSCRIPTION ENCOUNTER (OUTPATIENT)
Age: 41
End: 2021-01-28

## 2021-01-29 ENCOUNTER — APPOINTMENT (OUTPATIENT)
Dept: SPINE | Facility: HOSPITAL | Age: 41
End: 2021-01-29

## 2021-01-29 ENCOUNTER — INPATIENT (INPATIENT)
Facility: HOSPITAL | Age: 41
LOS: 1 days | Discharge: HOME | DRG: 518 | End: 2021-01-31
Attending: NEUROLOGICAL SURGERY | Admitting: NEUROLOGICAL SURGERY
Payer: COMMERCIAL

## 2021-01-29 VITALS
HEART RATE: 73 BPM | WEIGHT: 145.95 LBS | OXYGEN SATURATION: 100 % | HEIGHT: 66 IN | RESPIRATION RATE: 18 BRPM | SYSTOLIC BLOOD PRESSURE: 127 MMHG | TEMPERATURE: 98 F | DIASTOLIC BLOOD PRESSURE: 85 MMHG

## 2021-01-29 DIAGNOSIS — Z98.890 OTHER SPECIFIED POSTPROCEDURAL STATES: Chronic | ICD-10-CM

## 2021-01-29 DIAGNOSIS — M54.12 RADICULOPATHY, CERVICAL REGION: ICD-10-CM

## 2021-01-29 LAB
ANION GAP SERPL CALC-SCNC: 12 MMOL/L — SIGNIFICANT CHANGE UP (ref 5–17)
BASOPHILS # BLD AUTO: 0.03 K/UL — SIGNIFICANT CHANGE UP (ref 0–0.2)
BASOPHILS NFR BLD AUTO: 0.3 % — SIGNIFICANT CHANGE UP (ref 0–2)
BUN SERPL-MCNC: 17 MG/DL — SIGNIFICANT CHANGE UP (ref 7–23)
CALCIUM SERPL-MCNC: 7.9 MG/DL — LOW (ref 8.4–10.5)
CHLORIDE SERPL-SCNC: 106 MMOL/L — SIGNIFICANT CHANGE UP (ref 96–108)
CO2 SERPL-SCNC: 21 MMOL/L — LOW (ref 22–31)
CREAT SERPL-MCNC: 0.7 MG/DL — SIGNIFICANT CHANGE UP (ref 0.5–1.3)
EOSINOPHIL # BLD AUTO: 0 K/UL — SIGNIFICANT CHANGE UP (ref 0–0.5)
EOSINOPHIL NFR BLD AUTO: 0 % — SIGNIFICANT CHANGE UP (ref 0–6)
GLUCOSE SERPL-MCNC: 139 MG/DL — HIGH (ref 70–99)
HCG UR QL: NEGATIVE — SIGNIFICANT CHANGE UP
HCT VFR BLD CALC: 33.8 % — LOW (ref 34.5–45)
HGB BLD-MCNC: 11.2 G/DL — LOW (ref 11.5–15.5)
IMM GRANULOCYTES NFR BLD AUTO: 0.4 % — SIGNIFICANT CHANGE UP (ref 0–1.5)
LYMPHOCYTES # BLD AUTO: 0.52 K/UL — LOW (ref 1–3.3)
LYMPHOCYTES # BLD AUTO: 5.8 % — LOW (ref 13–44)
MCHC RBC-ENTMCNC: 29.9 PG — SIGNIFICANT CHANGE UP (ref 27–34)
MCHC RBC-ENTMCNC: 33.1 GM/DL — SIGNIFICANT CHANGE UP (ref 32–36)
MCV RBC AUTO: 90.1 FL — SIGNIFICANT CHANGE UP (ref 80–100)
MONOCYTES # BLD AUTO: 0.09 K/UL — SIGNIFICANT CHANGE UP (ref 0–0.9)
MONOCYTES NFR BLD AUTO: 1 % — LOW (ref 2–14)
NEUTROPHILS # BLD AUTO: 8.28 K/UL — HIGH (ref 1.8–7.4)
NEUTROPHILS NFR BLD AUTO: 92.5 % — HIGH (ref 43–77)
NRBC # BLD: 0 /100 WBCS — SIGNIFICANT CHANGE UP (ref 0–0)
PLATELET # BLD AUTO: 294 K/UL — SIGNIFICANT CHANGE UP (ref 150–400)
POTASSIUM SERPL-MCNC: 4.1 MMOL/L — SIGNIFICANT CHANGE UP (ref 3.5–5.3)
POTASSIUM SERPL-SCNC: 4.1 MMOL/L — SIGNIFICANT CHANGE UP (ref 3.5–5.3)
RBC # BLD: 3.75 M/UL — LOW (ref 3.8–5.2)
RBC # FLD: 13.4 % — SIGNIFICANT CHANGE UP (ref 10.3–14.5)
RH IG SCN BLD-IMP: NEGATIVE — SIGNIFICANT CHANGE UP
SODIUM SERPL-SCNC: 139 MMOL/L — SIGNIFICANT CHANGE UP (ref 135–145)
WBC # BLD: 8.96 K/UL — SIGNIFICANT CHANGE UP (ref 3.8–10.5)
WBC # FLD AUTO: 8.96 K/UL — SIGNIFICANT CHANGE UP (ref 3.8–10.5)

## 2021-01-29 RX ORDER — CEFAZOLIN SODIUM 1 G
2000 VIAL (EA) INJECTION ONCE
Refills: 0 | Status: COMPLETED | OUTPATIENT
Start: 2021-01-29 | End: 2021-01-29

## 2021-01-29 RX ORDER — OXYCODONE HYDROCHLORIDE 5 MG/1
5 TABLET ORAL ONCE
Refills: 0 | Status: DISCONTINUED | OUTPATIENT
Start: 2021-01-29 | End: 2021-01-29

## 2021-01-29 RX ORDER — SODIUM CHLORIDE 9 MG/ML
3 INJECTION INTRAMUSCULAR; INTRAVENOUS; SUBCUTANEOUS EVERY 8 HOURS
Refills: 0 | Status: DISCONTINUED | OUTPATIENT
Start: 2021-01-29 | End: 2021-01-29

## 2021-01-29 RX ORDER — ACETAMINOPHEN 500 MG
1000 TABLET ORAL ONCE
Refills: 0 | Status: COMPLETED | OUTPATIENT
Start: 2021-01-29 | End: 2021-01-30

## 2021-01-29 RX ORDER — MAGNESIUM HYDROXIDE 400 MG/1
30 TABLET, CHEWABLE ORAL EVERY 12 HOURS
Refills: 0 | Status: DISCONTINUED | OUTPATIENT
Start: 2021-01-29 | End: 2021-01-31

## 2021-01-29 RX ORDER — SENNA PLUS 8.6 MG/1
2 TABLET ORAL AT BEDTIME
Refills: 0 | Status: DISCONTINUED | OUTPATIENT
Start: 2021-01-29 | End: 2021-01-31

## 2021-01-29 RX ORDER — HYDROMORPHONE HYDROCHLORIDE 2 MG/ML
0.5 INJECTION INTRAMUSCULAR; INTRAVENOUS; SUBCUTANEOUS
Refills: 0 | Status: DISCONTINUED | OUTPATIENT
Start: 2021-01-29 | End: 2021-01-30

## 2021-01-29 RX ORDER — ONDANSETRON 8 MG/1
4 TABLET, FILM COATED ORAL EVERY 6 HOURS
Refills: 0 | Status: DISCONTINUED | OUTPATIENT
Start: 2021-01-29 | End: 2021-01-31

## 2021-01-29 RX ORDER — LIDOCAINE HCL 20 MG/ML
0.2 VIAL (ML) INJECTION ONCE
Refills: 0 | Status: DISCONTINUED | OUTPATIENT
Start: 2021-01-29 | End: 2021-01-29

## 2021-01-29 RX ORDER — CEFAZOLIN SODIUM 1 G
2000 VIAL (EA) INJECTION EVERY 8 HOURS
Refills: 0 | Status: COMPLETED | OUTPATIENT
Start: 2021-01-29 | End: 2021-01-30

## 2021-01-29 RX ORDER — ACETAMINOPHEN 500 MG
975 TABLET ORAL EVERY 6 HOURS
Refills: 0 | Status: DISCONTINUED | OUTPATIENT
Start: 2021-01-30 | End: 2021-01-31

## 2021-01-29 RX ORDER — ACETAMINOPHEN 500 MG
1000 TABLET ORAL ONCE
Refills: 0 | Status: DISCONTINUED | OUTPATIENT
Start: 2021-01-29 | End: 2021-01-29

## 2021-01-29 RX ORDER — HYDROMORPHONE HYDROCHLORIDE 2 MG/ML
1 INJECTION INTRAMUSCULAR; INTRAVENOUS; SUBCUTANEOUS
Refills: 0 | Status: DISCONTINUED | OUTPATIENT
Start: 2021-01-29 | End: 2021-01-30

## 2021-01-29 RX ORDER — DEXAMETHASONE 0.5 MG/5ML
4 ELIXIR ORAL EVERY 6 HOURS
Refills: 0 | Status: COMPLETED | OUTPATIENT
Start: 2021-01-29 | End: 2021-01-30

## 2021-01-29 RX ORDER — ONDANSETRON 8 MG/1
4 TABLET, FILM COATED ORAL ONCE
Refills: 0 | Status: COMPLETED | OUTPATIENT
Start: 2021-01-29 | End: 2021-01-29

## 2021-01-29 RX ORDER — CHLORHEXIDINE GLUCONATE 213 G/1000ML
1 SOLUTION TOPICAL ONCE
Refills: 0 | Status: COMPLETED | OUTPATIENT
Start: 2021-01-29 | End: 2021-01-29

## 2021-01-29 RX ORDER — METHOCARBAMOL 500 MG/1
750 TABLET, FILM COATED ORAL EVERY 6 HOURS
Refills: 0 | Status: DISCONTINUED | OUTPATIENT
Start: 2021-01-29 | End: 2021-01-31

## 2021-01-29 RX ORDER — SODIUM CHLORIDE 9 MG/ML
1000 INJECTION, SOLUTION INTRAVENOUS
Refills: 0 | Status: DISCONTINUED | OUTPATIENT
Start: 2021-01-29 | End: 2021-01-30

## 2021-01-29 RX ORDER — POLYETHYLENE GLYCOL 3350 17 G/17G
17 POWDER, FOR SOLUTION ORAL DAILY
Refills: 0 | Status: DISCONTINUED | OUTPATIENT
Start: 2021-01-29 | End: 2021-01-31

## 2021-01-29 RX ADMIN — OXYCODONE HYDROCHLORIDE 5 MILLIGRAM(S): 5 TABLET ORAL at 13:03

## 2021-01-29 RX ADMIN — HYDROMORPHONE HYDROCHLORIDE 0.5 MILLIGRAM(S): 2 INJECTION INTRAMUSCULAR; INTRAVENOUS; SUBCUTANEOUS at 21:45

## 2021-01-29 RX ADMIN — SODIUM CHLORIDE 75 MILLILITER(S): 9 INJECTION, SOLUTION INTRAVENOUS at 19:20

## 2021-01-29 RX ADMIN — ONDANSETRON 4 MILLIGRAM(S): 8 TABLET, FILM COATED ORAL at 20:00

## 2021-01-29 RX ADMIN — ONDANSETRON 4 MILLIGRAM(S): 8 TABLET, FILM COATED ORAL at 22:34

## 2021-01-29 RX ADMIN — Medication 100 MILLIGRAM(S): at 23:00

## 2021-01-29 RX ADMIN — HYDROMORPHONE HYDROCHLORIDE 0.5 MILLIGRAM(S): 2 INJECTION INTRAMUSCULAR; INTRAVENOUS; SUBCUTANEOUS at 19:03

## 2021-01-29 RX ADMIN — HYDROMORPHONE HYDROCHLORIDE 0.5 MILLIGRAM(S): 2 INJECTION INTRAMUSCULAR; INTRAVENOUS; SUBCUTANEOUS at 19:53

## 2021-01-30 ENCOUNTER — TRANSCRIPTION ENCOUNTER (OUTPATIENT)
Age: 41
End: 2021-01-30

## 2021-01-30 LAB
ANION GAP SERPL CALC-SCNC: 10 MMOL/L — SIGNIFICANT CHANGE UP (ref 5–17)
BUN SERPL-MCNC: 17 MG/DL — SIGNIFICANT CHANGE UP (ref 7–23)
CALCIUM SERPL-MCNC: 8.5 MG/DL — SIGNIFICANT CHANGE UP (ref 8.4–10.5)
CHLORIDE SERPL-SCNC: 105 MMOL/L — SIGNIFICANT CHANGE UP (ref 96–108)
CO2 SERPL-SCNC: 20 MMOL/L — LOW (ref 22–31)
CREAT SERPL-MCNC: 0.67 MG/DL — SIGNIFICANT CHANGE UP (ref 0.5–1.3)
GLUCOSE SERPL-MCNC: 121 MG/DL — HIGH (ref 70–99)
HCT VFR BLD CALC: 31.8 % — LOW (ref 34.5–45)
HGB BLD-MCNC: 10.9 G/DL — LOW (ref 11.5–15.5)
MCHC RBC-ENTMCNC: 30.9 PG — SIGNIFICANT CHANGE UP (ref 27–34)
MCHC RBC-ENTMCNC: 34.3 GM/DL — SIGNIFICANT CHANGE UP (ref 32–36)
MCV RBC AUTO: 90.1 FL — SIGNIFICANT CHANGE UP (ref 80–100)
NRBC # BLD: 0 /100 WBCS — SIGNIFICANT CHANGE UP (ref 0–0)
PLATELET # BLD AUTO: 256 K/UL — SIGNIFICANT CHANGE UP (ref 150–400)
POTASSIUM SERPL-MCNC: 4 MMOL/L — SIGNIFICANT CHANGE UP (ref 3.5–5.3)
POTASSIUM SERPL-SCNC: 4 MMOL/L — SIGNIFICANT CHANGE UP (ref 3.5–5.3)
RBC # BLD: 3.53 M/UL — LOW (ref 3.8–5.2)
RBC # FLD: 13.5 % — SIGNIFICANT CHANGE UP (ref 10.3–14.5)
SODIUM SERPL-SCNC: 135 MMOL/L — SIGNIFICANT CHANGE UP (ref 135–145)
WBC # BLD: 8.72 K/UL — SIGNIFICANT CHANGE UP (ref 3.8–10.5)
WBC # FLD AUTO: 8.72 K/UL — SIGNIFICANT CHANGE UP (ref 3.8–10.5)

## 2021-01-30 PROCEDURE — 93010 ELECTROCARDIOGRAM REPORT: CPT

## 2021-01-30 RX ORDER — METHOCARBAMOL 500 MG/1
1 TABLET, FILM COATED ORAL
Qty: 30 | Refills: 0
Start: 2021-01-30

## 2021-01-30 RX ORDER — ENOXAPARIN SODIUM 100 MG/ML
40 INJECTION SUBCUTANEOUS AT BEDTIME
Refills: 0 | Status: DISCONTINUED | OUTPATIENT
Start: 2021-01-30 | End: 2021-01-31

## 2021-01-30 RX ORDER — TRAMADOL HYDROCHLORIDE 50 MG/1
0.5 TABLET ORAL
Qty: 20 | Refills: 0
Start: 2021-01-30

## 2021-01-30 RX ORDER — METOCLOPRAMIDE HCL 10 MG
10 TABLET ORAL ONCE
Refills: 0 | Status: COMPLETED | OUTPATIENT
Start: 2021-01-30 | End: 2021-01-30

## 2021-01-30 RX ORDER — OXYCODONE HYDROCHLORIDE 5 MG/1
5 TABLET ORAL EVERY 4 HOURS
Refills: 0 | Status: DISCONTINUED | OUTPATIENT
Start: 2021-01-30 | End: 2021-01-30

## 2021-01-30 RX ORDER — MORPHINE SULFATE 50 MG/1
2 CAPSULE, EXTENDED RELEASE ORAL ONCE
Refills: 0 | Status: DISCONTINUED | OUTPATIENT
Start: 2021-01-30 | End: 2021-01-31

## 2021-01-30 RX ORDER — ACETAMINOPHEN 500 MG
1000 TABLET ORAL ONCE
Refills: 0 | Status: COMPLETED | OUTPATIENT
Start: 2021-01-30 | End: 2021-01-30

## 2021-01-30 RX ORDER — TRAMADOL HYDROCHLORIDE 50 MG/1
25 TABLET ORAL EVERY 4 HOURS
Refills: 0 | Status: DISCONTINUED | OUTPATIENT
Start: 2021-01-30 | End: 2021-01-31

## 2021-01-30 RX ADMIN — Medication 4 MILLIGRAM(S): at 00:00

## 2021-01-30 RX ADMIN — Medication 10 MILLIGRAM(S): at 02:58

## 2021-01-30 RX ADMIN — TRAMADOL HYDROCHLORIDE 25 MILLIGRAM(S): 50 TABLET ORAL at 21:05

## 2021-01-30 RX ADMIN — Medication 4 MILLIGRAM(S): at 17:38

## 2021-01-30 RX ADMIN — Medication 4 MILLIGRAM(S): at 12:01

## 2021-01-30 RX ADMIN — ENOXAPARIN SODIUM 40 MILLIGRAM(S): 100 INJECTION SUBCUTANEOUS at 21:11

## 2021-01-30 RX ADMIN — Medication 100 MILLIGRAM(S): at 06:24

## 2021-01-30 RX ADMIN — Medication 400 MILLIGRAM(S): at 00:00

## 2021-01-30 RX ADMIN — POLYETHYLENE GLYCOL 3350 17 GRAM(S): 17 POWDER, FOR SOLUTION ORAL at 12:03

## 2021-01-30 RX ADMIN — Medication 975 MILLIGRAM(S): at 23:59

## 2021-01-30 RX ADMIN — SENNA PLUS 2 TABLET(S): 8.6 TABLET ORAL at 21:05

## 2021-01-30 RX ADMIN — Medication 975 MILLIGRAM(S): at 17:38

## 2021-01-30 RX ADMIN — Medication 400 MILLIGRAM(S): at 06:24

## 2021-01-30 RX ADMIN — Medication 975 MILLIGRAM(S): at 12:02

## 2021-01-30 RX ADMIN — TRAMADOL HYDROCHLORIDE 25 MILLIGRAM(S): 50 TABLET ORAL at 15:05

## 2021-01-30 RX ADMIN — Medication 4 MILLIGRAM(S): at 06:24

## 2021-01-30 NOTE — PROGRESS NOTE ADULT - SUBJECTIVE AND OBJECTIVE BOX
Patient seen and examined at bedside.    --Anticoagulation--    T(C): 36.4 (01-30-21 @ 00:00), Max: 36.7 (01-29-21 @ 10:13)  HR: 73 (01-30-21 @ 00:00) (56 - 85)  BP: 143/83 (01-30-21 @ 00:00) (117/63 - 147/89)  RR: 18 (01-30-21 @ 00:00) (12 - 18)  SpO2: 100% (01-30-21 @ 00:00) (97% - 100%)  Wt(kg): --    Exam:  AOx3, FC, PERRL, EOMI, no facial, 5/5 throughout, no drift

## 2021-01-30 NOTE — DISCHARGE NOTE PROVIDER - NSDCMRMEDTOKEN_GEN_ALL_CORE_FT
acetaminophen 325 mg oral tablet: 3 tab(s) orally every 6 hours  Medrol Dosepak 4 mg oral tablet: SY: use as directed   methocarbamol 750 mg oral tablet: 1 tab(s) orally every 6 hours, As needed, Muscle Spasm  Rolling Walker : Dx: gait instability   traMADol 50 mg oral tablet: 0.5 tab(s) orally every 4 hours, As needed, Moderate Pain (4 - 6) MDD:3

## 2021-01-30 NOTE — PHYSICAL THERAPY INITIAL EVALUATION ADULT - ADDITIONAL COMMENTS
Pt lives on 2nd floor of 2 family house with spouse and 2 young children. PTA pt was independent with all mobility and ADLs.

## 2021-01-30 NOTE — PHYSICAL THERAPY INITIAL EVALUATION ADULT - PLANNED THERAPY INTERVENTIONS, PT EVAL
stair training: GOAL: Pt will negotiate up/down 10 steps, w/ SUP assist, w/ Right rails and appropriate AD, w/ step-to pattern in 2 weeks/balance training/bed mobility training/gait training/strengthening/transfer training

## 2021-01-30 NOTE — DISCHARGE NOTE PROVIDER - NSDCCPCAREPLAN_GEN_ALL_CORE_FT
PRINCIPAL DISCHARGE DIAGNOSIS  Diagnosis: Cervical spinal stenosis  Assessment and Plan of Treatment:

## 2021-01-30 NOTE — DISCHARGE NOTE PROVIDER - NSDCFUADDINST_GEN_ALL_CORE_FT
please do not engage in strenuous activity, heavy lifting, drive, or return to work or school until cleared by surgeon.  please keep incision clean and dry, do not submerge wound in water for prolonged periods of time, pat dry after showering, and do not use any creams or ointments to incision.  may shower 2/1 please do not engage in strenuous activity, heavy lifting, drive, or return to work or school until cleared by surgeon.  please keep incision clean and dry, do not submerge wound in water for prolonged periods of time, pat dry after showering, and do not use any creams or ointments to incision. You may remove your outer surgical bandage tomorrow.  Allow steri strips to fall off on their own.   may shower 2/1

## 2021-01-30 NOTE — DISCHARGE NOTE PROVIDER - HOSPITAL COURSE
41 y/o female with history of cervical radiculopathy presents today for presurgical evaluation.  She reports history of chronic intermittent neck pain, however she reports that the neck pain and right arm, pain, numbness, tingling and weakness have worsened over the past 2.5 months.  Patient had a c5-7 tdr on 1/29.  Patient did well post operatively and drain taken out on 1/30.  Physical therapy saw patient and recommended for outpatient physical therapy.

## 2021-01-30 NOTE — DISCHARGE NOTE PROVIDER - NSDCCPTREATMENT_GEN_ALL_CORE_FT
PRINCIPAL PROCEDURE  Procedure: Replacement of total spinal disc, cervical  Findings and Treatment: 1/29  follow up with Dr. Guillen in the office in 1 week

## 2021-01-30 NOTE — DISCHARGE NOTE PROVIDER - CARE PROVIDER_API CALL
Thor Guillen (MD)  Neurosurgery  [address] [place] [address], [address]  [place] [address]  Phone: [phone]  Fax: [phone]  Follow Up Time:

## 2021-01-31 ENCOUNTER — TRANSCRIPTION ENCOUNTER (OUTPATIENT)
Age: 41
End: 2021-01-31

## 2021-01-31 VITALS
HEART RATE: 83 BPM | TEMPERATURE: 98 F | SYSTOLIC BLOOD PRESSURE: 123 MMHG | RESPIRATION RATE: 16 BRPM | DIASTOLIC BLOOD PRESSURE: 81 MMHG | OXYGEN SATURATION: 99 %

## 2021-01-31 LAB
ANION GAP SERPL CALC-SCNC: 11 MMOL/L — SIGNIFICANT CHANGE UP (ref 5–17)
BUN SERPL-MCNC: 14 MG/DL — SIGNIFICANT CHANGE UP (ref 7–23)
CALCIUM SERPL-MCNC: 9.1 MG/DL — SIGNIFICANT CHANGE UP (ref 8.4–10.5)
CHLORIDE SERPL-SCNC: 105 MMOL/L — SIGNIFICANT CHANGE UP (ref 96–108)
CO2 SERPL-SCNC: 23 MMOL/L — SIGNIFICANT CHANGE UP (ref 22–31)
CREAT SERPL-MCNC: 0.68 MG/DL — SIGNIFICANT CHANGE UP (ref 0.5–1.3)
GLUCOSE SERPL-MCNC: 102 MG/DL — HIGH (ref 70–99)
HCT VFR BLD CALC: 33.1 % — LOW (ref 34.5–45)
HGB BLD-MCNC: 11.1 G/DL — LOW (ref 11.5–15.5)
MCHC RBC-ENTMCNC: 30.5 PG — SIGNIFICANT CHANGE UP (ref 27–34)
MCHC RBC-ENTMCNC: 33.5 GM/DL — SIGNIFICANT CHANGE UP (ref 32–36)
MCV RBC AUTO: 90.9 FL — SIGNIFICANT CHANGE UP (ref 80–100)
NRBC # BLD: 0 /100 WBCS — SIGNIFICANT CHANGE UP (ref 0–0)
PLATELET # BLD AUTO: 317 K/UL — SIGNIFICANT CHANGE UP (ref 150–400)
POTASSIUM SERPL-MCNC: 4 MMOL/L — SIGNIFICANT CHANGE UP (ref 3.5–5.3)
POTASSIUM SERPL-SCNC: 4 MMOL/L — SIGNIFICANT CHANGE UP (ref 3.5–5.3)
RBC # BLD: 3.64 M/UL — LOW (ref 3.8–5.2)
RBC # FLD: 13.7 % — SIGNIFICANT CHANGE UP (ref 10.3–14.5)
SODIUM SERPL-SCNC: 139 MMOL/L — SIGNIFICANT CHANGE UP (ref 135–145)
WBC # BLD: 14.35 K/UL — HIGH (ref 3.8–10.5)
WBC # FLD AUTO: 14.35 K/UL — HIGH (ref 3.8–10.5)

## 2021-01-31 PROCEDURE — 80048 BASIC METABOLIC PNL TOTAL CA: CPT

## 2021-01-31 PROCEDURE — C1713: CPT

## 2021-01-31 PROCEDURE — 76000 FLUOROSCOPY <1 HR PHYS/QHP: CPT

## 2021-01-31 PROCEDURE — 97530 THERAPEUTIC ACTIVITIES: CPT

## 2021-01-31 PROCEDURE — C1769: CPT

## 2021-01-31 PROCEDURE — 93005 ELECTROCARDIOGRAM TRACING: CPT

## 2021-01-31 PROCEDURE — 85025 COMPLETE CBC W/AUTO DIFF WBC: CPT

## 2021-01-31 PROCEDURE — 97162 PT EVAL MOD COMPLEX 30 MIN: CPT

## 2021-01-31 PROCEDURE — 81025 URINE PREGNANCY TEST: CPT

## 2021-01-31 PROCEDURE — C1889: CPT

## 2021-01-31 RX ADMIN — Medication 975 MILLIGRAM(S): at 04:54

## 2021-01-31 RX ADMIN — TRAMADOL HYDROCHLORIDE 25 MILLIGRAM(S): 50 TABLET ORAL at 11:52

## 2021-01-31 RX ADMIN — POLYETHYLENE GLYCOL 3350 17 GRAM(S): 17 POWDER, FOR SOLUTION ORAL at 11:51

## 2021-01-31 RX ADMIN — Medication 975 MILLIGRAM(S): at 11:52

## 2021-01-31 RX ADMIN — TRAMADOL HYDROCHLORIDE 25 MILLIGRAM(S): 50 TABLET ORAL at 02:33

## 2021-01-31 NOTE — PROGRESS NOTE ADULT - ASSESSMENT
40 years olf female presents with RUE radiculopathy POD#2 s/p C5-7 total disc replacement.     Neuro:  Q4 checks  Pain control; tramadol, robaxin, tyelonol.  Medrol Dose Tung on DC  Remove dressing tomorrow (instructed patient)    Card: -160    Pulm: Incentive spirometry.     Renal: IVL, voiding    GI: bowel regimen    Endo: Euglycemic    Heme: SCDs, SQL.    Dispo: Home with outpatient PT.  Patient requested Rolling walker / can for ambulation.  Care Coordination working with patient to obtain.     D/W Dr. Guillen.     Discussed with patient wound care, follow up plans, activity, and medications. Questions answered, and they verbalized understanding.    [id number]

## 2021-01-31 NOTE — DISCHARGE NOTE NURSING/CASE MANAGEMENT/SOCIAL WORK - PATIENT PORTAL LINK FT
You can access the FollowMyHealth Patient Portal offered by [facility] by registering at the following website: [email or web address]. By joining [facility]’s FollowMyHealth portal, you will also be able to view your health information using other applications (apps) compatible with our system.

## 2021-01-31 NOTE — PROGRESS NOTE ADULT - SUBJECTIVE AND OBJECTIVE BOX
SUBJECTIVE: Patient seen and examined in bed, eating breakfast.  No acute complaints.     OVERNIGHT EVENTS: None    Vital Signs Last 24 Hrs  T(C): 37.1 (31 Jan 2021 04:13), Max: 37.3 (30 Jan 2021 11:53)  T(F): 98.8 (31 Jan 2021 04:13), Max: 99.1 (30 Jan 2021 11:53)  HR: 70 (31 Jan 2021 04:13) (66 - 87)  BP: 133/88 (31 Jan 2021 04:13) (111/62 - 133/88)  BP(mean): --  RR: 16 (31 Jan 2021 04:13) (16 - 16)  SpO2: 95% (31 Jan 2021 04:13) (95% - 100%)    DRAINS: None    PHYSICAL EXAM:    Constitutional: No Acute Distress   Neurological: AOx3, Following Commands, Moving all Extremities   Motor exam:          Upper extremity                         Delt     Bicep     Tricep    HG                                                 R         5/5        5/5        5/5       5/5                                               L          5/5        5/5        5/5       5/5          Lower extremity                        HF         KF        KE       DF         PF                                                  R        5/5        5/5        5/5       5/5         5/5                                               L         5/5        5/5       5/5       5/5          5/5                                                 Sensation: [x] intact to light touch  [] decreased:     Pulmonary: Clear to Auscultation, No rales, No rhonchi, No wheezes   Cardiovascular: S1, S2, Regular rate and rhythm   Gastrointestinal: Soft, Non-tender, Non-distended   Extremities: No calf tenderness     Incision: C/D/I dressing intact.     LABS:                        11.1   14.35 )-----------( 317      ( 31 Jan 2021 05:34 )             33.1    01-31    139  |  105  |  14  ----------------------------<  102<H>  4.0   |  23  |  0.68    Ca    9.1      31 Jan 2021 05:34        MEDICATIONS:  Antibiotics:    Neuro:  acetaminophen   Tablet .. 975 milliGRAM(s) Oral every 6 hours  methocarbamol 750 milliGRAM(s) Oral every 6 hours PRN Muscle Spasm  ondansetron Injectable 4 milliGRAM(s) IV Push every 6 hours PRN Nausea  traMADol 25 milliGRAM(s) Oral every 4 hours PRN Moderate Pain (4 - 6)    Cardiac:    Pulm:    GI/:  magnesium hydroxide Suspension 30 milliLiter(s) Oral every 12 hours PRN Constipation  polyethylene glycol 3350 17 Gram(s) Oral daily  senna 2 Tablet(s) Oral at bedtime    Other:     DIET: [x] Regular [] CCD [] Renal [] Puree [] Dysphagia [] Tube Feeds:     IMAGING:

## 2021-02-05 ENCOUNTER — APPOINTMENT (OUTPATIENT)
Dept: SPINE | Facility: CLINIC | Age: 41
End: 2021-02-05
Payer: COMMERCIAL

## 2021-02-05 VITALS
DIASTOLIC BLOOD PRESSURE: 95 MMHG | OXYGEN SATURATION: 100 % | TEMPERATURE: 97.6 F | SYSTOLIC BLOOD PRESSURE: 137 MMHG | HEIGHT: 66 IN | BODY MASS INDEX: 23.95 KG/M2 | WEIGHT: 149 LBS | HEART RATE: 78 BPM

## 2021-02-05 VITALS
TEMPERATURE: 97.6 F | SYSTOLIC BLOOD PRESSURE: 137 MMHG | HEIGHT: 66 IN | HEART RATE: 78 BPM | WEIGHT: 149 LBS | RESPIRATION RATE: 14 BRPM | DIASTOLIC BLOOD PRESSURE: 95 MMHG | BODY MASS INDEX: 23.95 KG/M2

## 2021-02-05 PROCEDURE — 99024 POSTOP FOLLOW-UP VISIT: CPT

## 2021-02-05 NOTE — REASON FOR VISIT
[de-identified] : 1/29/2021 [de-identified] : 7 [de-identified] : 1 [de-identified] : C 5 C 6 and C 6 C 7 total disc replacement  [Spouse] : spouse

## 2021-02-05 NOTE — ASSESSMENT
[FreeTextEntry1] : One week post op TDR C5 C 6 and C 6 C 7 for herniated discs and osteophytes.   Significantly improved since surgery with resolution of her right arm radiculopathy.  Edematous right UE with ecchymosis and full ROM with strong radial pulse.  A US doppler study was ordered to rule out a DVT.  She will obtain a cervical AP and lateral xray and return in three weeks for review.  No lifting over five pounds was advised and avoid hyperflexion and extension.   She will keep the incision clean and dry and pat it dry when washing.  She may drive if she is off  of Cyclobenzaprine.  Supplement Tramadol with Tylenol when feasible.

## 2021-02-05 NOTE — REVIEW OF SYSTEMS
[Hand Weakness] :  hand weakness [Negative] : Heme/Lymph [de-identified] : right arm swelling and ecchymosis, right sided neck spasms

## 2021-02-05 NOTE — HISTORY OF PRESENT ILLNESS
[FreeTextEntry1] : Today Ms. Nielsen was seen for her first post op visit following a C 5 C 6, C 6 C 7 total disc replacement for a herniated  disc.  She is one week postop and feeling much improved since surgery.  Her shooting pain down the right arm has dissipated and her  shoulder pain has resolved.  The strength in her right arm has increased.   She reports swallowing issues that were present in the hospital are improving and are almost resolved.  She has stiffness of her neck on the right side and this is relieved with Cyclobenzaprine. \par \par The anterior neck incision is clean, flat and covered with intact steri strips. She has right forearm pain from an ecchymotic area from her hand to the elbow with swelling.  She takes Tylenol PRN and Tramadol once a day.   Cyclobenzaprine is taken twice a day for comfort and she is no longer on narcotics.  She has been utilizing a cane for safety measures.

## 2021-02-05 NOTE — PHYSICAL EXAM
[Longitudinal] : longitudinal [Clean] : clean [Dry] : dry [Well-Healed] : well-healed [Intact] : intact [No Drainage] : without drainage [Normal Skin] : normal [Erythema] : not erythematous [Tender] : not tender [Warm] : not warm [Oriented To Time, Place, And Person] : oriented to person, place, and time [Person] : oriented to person [Place] : oriented to place [Time] : oriented to time [Short Term Intact] : short term memory intact [Remote Intact] : remote memory intact [Registration Intact] : recent registration memory intact [Span Intact] : the attention span was normal [Concentration Intact] : normal concentrating ability [Visual Intact] : visual attention was ~T not ~L decreased [Fluency] : fluency intact [Comprehension] : comprehension intact [Reading] : reading intact [Current Events] : adequate knowledge of current events [Past History] : adequate knowledge of personal past history [Vocabulary] : adequate range of vocabulary [Motor Tone] : muscle tone was normal in all four extremities [Motor Strength] : muscle strength was normal in all four extremities [Involuntary Movements] : no involuntary movements were seen [Sensation Tactile Decrease] : light touch was intact [Balance] : balance was intact [FreeTextEntry6] : Right arm with large ecchymosis and swelling of hand and forearm.  Puncture sites present on hand, wrist and arm that are clean with out drainage. Full ROM and positive strong pulse in radial area intact. [No Visual Abnormalities] : no visible abnormalities [Sclera] : the sclera and conjunctiva were normal [PERRL With Normal Accommodation] : pupils were equal in size, round, reactive to light, with normal accommodation [Outer Ear] : the ears and nose were normal in appearance [Neck Appearance] : the appearance of the neck was normal [FreeTextEntry1] : Limited ROM of hyperflexion and extension [Abnormal Walk] : normal gait [Skin Color & Pigmentation] : normal skin color and pigmentation

## 2021-02-12 ENCOUNTER — NON-APPOINTMENT (OUTPATIENT)
Age: 41
End: 2021-02-12

## 2021-03-01 ENCOUNTER — APPOINTMENT (OUTPATIENT)
Dept: RADIOLOGY | Facility: CLINIC | Age: 41
End: 2021-03-01
Payer: COMMERCIAL

## 2021-03-01 ENCOUNTER — APPOINTMENT (OUTPATIENT)
Dept: SPINE | Facility: CLINIC | Age: 41
End: 2021-03-01
Payer: COMMERCIAL

## 2021-03-01 ENCOUNTER — RESULT REVIEW (OUTPATIENT)
Age: 41
End: 2021-03-01

## 2021-03-01 ENCOUNTER — OUTPATIENT (OUTPATIENT)
Dept: OUTPATIENT SERVICES | Facility: HOSPITAL | Age: 41
LOS: 1 days | End: 2021-03-01
Payer: COMMERCIAL

## 2021-03-01 VITALS
HEIGHT: 66 IN | BODY MASS INDEX: 24.11 KG/M2 | SYSTOLIC BLOOD PRESSURE: 104 MMHG | WEIGHT: 150 LBS | TEMPERATURE: 97.9 F | OXYGEN SATURATION: 99 % | DIASTOLIC BLOOD PRESSURE: 70 MMHG | HEART RATE: 74 BPM

## 2021-03-01 VITALS — BODY MASS INDEX: 23.24 KG/M2 | WEIGHT: 144 LBS

## 2021-03-01 DIAGNOSIS — Z98.890 OTHER SPECIFIED POSTPROCEDURAL STATES: Chronic | ICD-10-CM

## 2021-03-01 DIAGNOSIS — Z87.19 PERSONAL HISTORY OF OTHER DISEASES OF THE DIGESTIVE SYSTEM: ICD-10-CM

## 2021-03-01 DIAGNOSIS — M50.20 OTHER CERVICAL DISC DISPLACEMENT, UNSPECIFIED CERVICAL REGION: ICD-10-CM

## 2021-03-01 DIAGNOSIS — Z86.59 PERSONAL HISTORY OF OTHER MENTAL AND BEHAVIORAL DISORDERS: ICD-10-CM

## 2021-03-01 DIAGNOSIS — M54.12 RADICULOPATHY, CERVICAL REGION: ICD-10-CM

## 2021-03-01 DIAGNOSIS — N95.2 POSTMENOPAUSAL ATROPHIC VAGINITIS: ICD-10-CM

## 2021-03-01 DIAGNOSIS — Z87.09 PERSONAL HISTORY OF OTHER DISEASES OF THE RESPIRATORY SYSTEM: ICD-10-CM

## 2021-03-01 PROCEDURE — 72040 X-RAY EXAM NECK SPINE 2-3 VW: CPT

## 2021-03-01 PROCEDURE — 72040 X-RAY EXAM NECK SPINE 2-3 VW: CPT | Mod: 26

## 2021-03-01 PROCEDURE — 99024 POSTOP FOLLOW-UP VISIT: CPT

## 2021-03-01 RX ORDER — METRONIDAZOLE 500 MG/1
500 TABLET ORAL
Qty: 42 | Refills: 0 | Status: ACTIVE | COMMUNITY
Start: 2020-09-16

## 2021-03-01 RX ORDER — IBUPROFEN 600 MG/1
600 TABLET, FILM COATED ORAL
Qty: 28 | Refills: 0 | Status: ACTIVE | COMMUNITY
Start: 2021-01-04

## 2021-03-02 PROBLEM — Z87.19 HISTORY OF CONSTIPATION: Status: RESOLVED | Noted: 2018-12-03 | Resolved: 2021-03-02

## 2021-03-02 PROBLEM — Z86.59 HISTORY OF DEPRESSION: Status: RESOLVED | Noted: 2018-12-03 | Resolved: 2021-03-02

## 2021-03-02 PROBLEM — Z87.09 HISTORY OF BRONCHITIS: Status: RESOLVED | Noted: 2018-12-03 | Resolved: 2021-03-02

## 2021-03-02 PROBLEM — N95.2 VAGINAL ATROPHY: Status: RESOLVED | Noted: 2018-12-03 | Resolved: 2021-03-02

## 2021-03-02 NOTE — PHYSICAL EXAM
[General Appearance - Alert] : alert [General Appearance - In No Acute Distress] : in no acute distress [Clean] : clean [Dry] : dry [Healing Well] : healing well [No Drainage] : without drainage [Normal Skin] : normal [Erythema] : not erythematous [Tender] : not tender [Warm] : not warm [Normal Skin Turgor] : skin turgor was normal [FreeTextEntry1] : anterior neck  [Oriented To Time, Place, And Person] : oriented to person, place, and time [Impaired Insight] : insight and judgment were intact [Affect] : the affect was normal [Mood] : the mood was normal [Person] : oriented to person [Place] : oriented to place [Time] : oriented to time [Short Term Intact] : short term memory intact [Remote Intact] : remote memory intact [Registration Intact] : recent registration memory intact [Span Intact] : the attention span was normal [Concentration Intact] : normal concentrating ability [Visual Intact] : visual attention was ~T not ~L decreased [Fluency] : fluency intact [Comprehension] : comprehension intact [Reading] : reading intact [Current Events] : adequate knowledge of current events [Past History] : adequate knowledge of personal past history [Vocabulary] : adequate range of vocabulary [Cranial Nerves Vestibulocochlear (VIII)] : hearing was intact bilaterally [Cranial Nerves Accessory (XI - Cranial And Spinal)] : head turning and shoulder shrug symmetric [Motor Tone] : muscle tone was normal in all four extremities [Motor Strength] : muscle strength was normal in all four extremities [Sensation Tactile Decrease] : light touch was intact [Abnormal Walk] : normal gait [Balance] : balance was intact [Tremor] : no tremor present [de-identified] : Limited ROM hyperextension and flexion.  Side to side motion intact [No Visual Abnormalities] : no visible abnormalities [No Masses] : no masses [Sclera] : the sclera and conjunctiva were normal [Outer Ear] : the ears and nose were normal in appearance [Neck Appearance] : the appearance of the neck was normal [] : no respiratory distress [Skin Color & Pigmentation] : normal skin color and pigmentation

## 2021-03-02 NOTE — ASSESSMENT
[FreeTextEntry1] : One month postop cervical TDR for a herniated disc and   Her incision is well healed and skin intact.  Right arm with ecchymosis and neurovascular exam normal with full strength, pulse 3+ and rapid capillary refill .  Two sutures removed on incision.   She will begin PT.  Cervical  xrays appear in good alignment and hardware intact.  She will f/u in six weeks with updated cervical xrays.  No heavy lifting over 10-15 pounds.  She may drive .\par

## 2021-03-02 NOTE — REASON FOR VISIT
[Other: _____] : [unfilled] [de-identified] : TDR [de-identified] : 1/29/2021 [de-identified] : 31 [de-identified] : 5 [de-identified] : C 5 C6, C 6 C7 total disc replacement

## 2021-03-02 NOTE — HISTORY OF PRESENT ILLNESS
[FreeTextEntry1] : Ms Nielsen is one month post op from a cervical TDR for a herniated disc.  She has resolution of arm tingling and numbness.  Black neck pain.   She is reporting coordination issues of both hands.  Her right arm remains ecchymotic and she has not had an US of the extremity.  Her pulse is strong at 3+ and she has full ROM of her right arm without swelling.  She has no trouble swallowing and is walking freely.  The anterior neck incision has sutures present at each end and skin is intact with clean margins.   She is off mnarcotics.

## 2021-05-05 PROBLEM — Z00.00 ENCOUNTER FOR PREVENTIVE HEALTH EXAMINATION: Status: ACTIVE | Noted: 2021-05-05

## 2021-05-10 ENCOUNTER — APPOINTMENT (OUTPATIENT)
Dept: SPINE | Facility: CLINIC | Age: 41
End: 2021-05-10
Payer: COMMERCIAL

## 2021-05-10 ENCOUNTER — APPOINTMENT (OUTPATIENT)
Dept: RADIOLOGY | Facility: CLINIC | Age: 41
End: 2021-05-10

## 2021-05-10 ENCOUNTER — OUTPATIENT (OUTPATIENT)
Dept: OUTPATIENT SERVICES | Facility: HOSPITAL | Age: 41
LOS: 1 days | End: 2021-05-10
Payer: COMMERCIAL

## 2021-05-10 VITALS
BODY MASS INDEX: 23.14 KG/M2 | DIASTOLIC BLOOD PRESSURE: 76 MMHG | TEMPERATURE: 97.8 F | WEIGHT: 144 LBS | OXYGEN SATURATION: 99 % | HEIGHT: 66 IN | HEART RATE: 69 BPM | SYSTOLIC BLOOD PRESSURE: 138 MMHG

## 2021-05-10 DIAGNOSIS — M50.20 OTHER CERVICAL DISC DISPLACEMENT, UNSPECIFIED CERVICAL REGION: ICD-10-CM

## 2021-05-10 DIAGNOSIS — Z00.8 ENCOUNTER FOR OTHER GENERAL EXAMINATION: ICD-10-CM

## 2021-05-10 DIAGNOSIS — Z98.890 OTHER SPECIFIED POSTPROCEDURAL STATES: Chronic | ICD-10-CM

## 2021-05-10 DIAGNOSIS — M54.12 RADICULOPATHY, CERVICAL REGION: ICD-10-CM

## 2021-05-10 PROCEDURE — 72040 X-RAY EXAM NECK SPINE 2-3 VW: CPT | Mod: 26

## 2021-05-10 PROCEDURE — 99072 ADDL SUPL MATRL&STAF TM PHE: CPT

## 2021-05-10 PROCEDURE — 99212 OFFICE O/P EST SF 10 MIN: CPT

## 2021-05-10 PROCEDURE — 72040 X-RAY EXAM NECK SPINE 2-3 VW: CPT

## 2021-05-10 RX ORDER — METHYLPREDNISOLONE 4 MG/1
4 TABLET ORAL
Qty: 21 | Refills: 0 | Status: DISCONTINUED | COMMUNITY
Start: 2021-01-30 | End: 2021-05-10

## 2021-05-10 RX ORDER — CLINDAMYCIN HYDROCHLORIDE 300 MG/1
300 CAPSULE ORAL
Qty: 21 | Refills: 0 | Status: DISCONTINUED | COMMUNITY
Start: 2021-01-05 | End: 2021-05-10

## 2021-05-11 NOTE — REASON FOR VISIT
[Follow-Up: _____] : a [unfilled] follow-up visit [FreeTextEntry1] : We will 3 and half months status post 2 level cervical total disc replacement. Occasional neck pain. Has been exercising regularly. Recent x-rays show no change in hardware or alignment.

## 2021-05-11 NOTE — ASSESSMENT
[FreeTextEntry1] : doing well now 3 months status post 2 level ACDF.Return in 4 months with flexion extension x-rays.

## 2021-05-11 NOTE — PHYSICAL EXAM
[Motor Strength] : muscle strength was normal in all four extremities [Sensation Tactile Decrease] : light touch was intact [General Appearance - Alert] : alert [General Appearance - In No Acute Distress] : in no acute distress [Oriented To Time, Place, And Person] : oriented to person, place, and time [] : no respiratory distress [Heart Rate And Rhythm] : heart rate was normal and rhythm regular [Abnormal Walk] : normal gait

## 2021-05-11 NOTE — END OF VISIT
[FreeTextEntry3] : I, Dr.Peter Guillen, evaluated the patient with the nurse practitioner Cesar Cooley and established the plan of care. I personally discuss this patient with the nurse practitioner at the time of the visit. I agree with the assessment and plan as written, unless noted below.\par

## 2021-09-13 ENCOUNTER — APPOINTMENT (OUTPATIENT)
Dept: SPINE | Facility: CLINIC | Age: 41
End: 2021-09-13

## 2021-12-10 ENCOUNTER — APPOINTMENT (OUTPATIENT)
Dept: ULTRASOUND IMAGING | Facility: CLINIC | Age: 41
End: 2021-12-10
Payer: COMMERCIAL

## 2021-12-10 ENCOUNTER — APPOINTMENT (OUTPATIENT)
Dept: MAMMOGRAPHY | Facility: CLINIC | Age: 41
End: 2021-12-10
Payer: COMMERCIAL

## 2021-12-10 PROCEDURE — 77067 SCR MAMMO BI INCL CAD: CPT

## 2021-12-10 PROCEDURE — 77063 BREAST TOMOSYNTHESIS BI: CPT

## 2021-12-10 PROCEDURE — 76641 ULTRASOUND BREAST COMPLETE: CPT | Mod: 50

## 2022-01-11 ENCOUNTER — EMERGENCY (EMERGENCY)
Facility: HOSPITAL | Age: 42
LOS: 1 days | Discharge: HOME | End: 2022-01-11
Attending: STUDENT IN AN ORGANIZED HEALTH CARE EDUCATION/TRAINING PROGRAM
Payer: COMMERCIAL

## 2022-01-11 VITALS
OXYGEN SATURATION: 100 % | SYSTOLIC BLOOD PRESSURE: 127 MMHG | HEART RATE: 72 BPM | WEIGHT: 149.91 LBS | RESPIRATION RATE: 18 BRPM | DIASTOLIC BLOOD PRESSURE: 87 MMHG | TEMPERATURE: 98 F | HEIGHT: 66 IN

## 2022-01-11 DIAGNOSIS — Z98.890 OTHER SPECIFIED POSTPROCEDURAL STATES: Chronic | ICD-10-CM

## 2022-01-11 LAB
ALBUMIN SERPL ELPH-MCNC: 5 G/DL — SIGNIFICANT CHANGE UP (ref 3.3–5)
ALP SERPL-CCNC: 50 U/L — SIGNIFICANT CHANGE UP (ref 40–120)
ALT FLD-CCNC: 19 U/L — SIGNIFICANT CHANGE UP (ref 10–45)
ANION GAP SERPL CALC-SCNC: 11 MMOL/L — SIGNIFICANT CHANGE UP (ref 5–17)
APTT BLD: 34.5 SEC — SIGNIFICANT CHANGE UP (ref 27.5–35.5)
AST SERPL-CCNC: 16 U/L — SIGNIFICANT CHANGE UP (ref 10–40)
BASOPHILS # BLD AUTO: 0.04 K/UL — SIGNIFICANT CHANGE UP (ref 0–0.2)
BASOPHILS NFR BLD AUTO: 0.7 % — SIGNIFICANT CHANGE UP (ref 0–2)
BILIRUB SERPL-MCNC: 0.4 MG/DL — SIGNIFICANT CHANGE UP (ref 0.2–1.2)
BUN SERPL-MCNC: 15 MG/DL — SIGNIFICANT CHANGE UP (ref 7–23)
CALCIUM SERPL-MCNC: 9.8 MG/DL — SIGNIFICANT CHANGE UP (ref 8.4–10.5)
CHLORIDE SERPL-SCNC: 102 MMOL/L — SIGNIFICANT CHANGE UP (ref 96–108)
CO2 SERPL-SCNC: 24 MMOL/L — SIGNIFICANT CHANGE UP (ref 22–31)
CREAT SERPL-MCNC: 0.87 MG/DL — SIGNIFICANT CHANGE UP (ref 0.5–1.3)
EOSINOPHIL # BLD AUTO: 0.01 K/UL — SIGNIFICANT CHANGE UP (ref 0–0.5)
EOSINOPHIL NFR BLD AUTO: 0.2 % — SIGNIFICANT CHANGE UP (ref 0–6)
GLUCOSE SERPL-MCNC: 129 MG/DL — HIGH (ref 70–99)
HCG SERPL-ACNC: <2 MIU/ML — SIGNIFICANT CHANGE UP
HCT VFR BLD CALC: 37 % — SIGNIFICANT CHANGE UP (ref 34.5–45)
HGB BLD-MCNC: 12.2 G/DL — SIGNIFICANT CHANGE UP (ref 11.5–15.5)
IMM GRANULOCYTES NFR BLD AUTO: 0.2 % — SIGNIFICANT CHANGE UP (ref 0–1.5)
INR BLD: 1.04 RATIO — SIGNIFICANT CHANGE UP (ref 0.88–1.16)
LYMPHOCYTES # BLD AUTO: 0.57 K/UL — LOW (ref 1–3.3)
LYMPHOCYTES # BLD AUTO: 9.4 % — LOW (ref 13–44)
MCHC RBC-ENTMCNC: 29.3 PG — SIGNIFICANT CHANGE UP (ref 27–34)
MCHC RBC-ENTMCNC: 33 GM/DL — SIGNIFICANT CHANGE UP (ref 32–36)
MCV RBC AUTO: 88.9 FL — SIGNIFICANT CHANGE UP (ref 80–100)
MONOCYTES # BLD AUTO: 0.3 K/UL — SIGNIFICANT CHANGE UP (ref 0–0.9)
MONOCYTES NFR BLD AUTO: 4.9 % — SIGNIFICANT CHANGE UP (ref 2–14)
NEUTROPHILS # BLD AUTO: 5.15 K/UL — SIGNIFICANT CHANGE UP (ref 1.8–7.4)
NEUTROPHILS NFR BLD AUTO: 84.6 % — HIGH (ref 43–77)
NRBC # BLD: 0 /100 WBCS — SIGNIFICANT CHANGE UP (ref 0–0)
PLATELET # BLD AUTO: 325 K/UL — SIGNIFICANT CHANGE UP (ref 150–400)
POTASSIUM SERPL-MCNC: 4 MMOL/L — SIGNIFICANT CHANGE UP (ref 3.5–5.3)
POTASSIUM SERPL-SCNC: 4 MMOL/L — SIGNIFICANT CHANGE UP (ref 3.5–5.3)
PROT SERPL-MCNC: 7.2 G/DL — SIGNIFICANT CHANGE UP (ref 6–8.3)
PROTHROM AB SERPL-ACNC: 12.5 SEC — SIGNIFICANT CHANGE UP (ref 10.6–13.6)
RBC # BLD: 4.16 M/UL — SIGNIFICANT CHANGE UP (ref 3.8–5.2)
RBC # FLD: 14.1 % — SIGNIFICANT CHANGE UP (ref 10.3–14.5)
SARS-COV-2 RNA SPEC QL NAA+PROBE: SIGNIFICANT CHANGE UP
SODIUM SERPL-SCNC: 137 MMOL/L — SIGNIFICANT CHANGE UP (ref 135–145)
TROPONIN T, HIGH SENSITIVITY RESULT: <6 NG/L — SIGNIFICANT CHANGE UP (ref 0–51)
WBC # BLD: 6.08 K/UL — SIGNIFICANT CHANGE UP (ref 3.8–10.5)
WBC # FLD AUTO: 6.08 K/UL — SIGNIFICANT CHANGE UP (ref 3.8–10.5)

## 2022-01-11 PROCEDURE — 99220: CPT

## 2022-01-11 PROCEDURE — 70496 CT ANGIOGRAPHY HEAD: CPT | Mod: 26,MA

## 2022-01-11 PROCEDURE — 70498 CT ANGIOGRAPHY NECK: CPT | Mod: 26,MA

## 2022-01-11 PROCEDURE — 0042T: CPT

## 2022-01-11 PROCEDURE — 99243 OFF/OP CNSLTJ NEW/EST LOW 30: CPT | Mod: GC

## 2022-01-11 RX ORDER — MECLIZINE HCL 12.5 MG
50 TABLET ORAL ONCE
Refills: 0 | Status: COMPLETED | OUTPATIENT
Start: 2022-01-11 | End: 2022-01-11

## 2022-01-11 RX ORDER — METOCLOPRAMIDE HCL 10 MG
10 TABLET ORAL ONCE
Refills: 0 | Status: COMPLETED | OUTPATIENT
Start: 2022-01-11 | End: 2022-01-11

## 2022-01-11 RX ADMIN — Medication 10 MILLIGRAM(S): at 22:29

## 2022-01-11 RX ADMIN — Medication 50 MILLIGRAM(S): at 17:03

## 2022-01-11 NOTE — CONSULT NOTE ADULT - ATTENDING COMMENTS
NEUROLOGY ATTENDING- [facility] [phone]    CC: DIZZY, VOMITING    PATIENT SEEN & EXAMINED WITH NEUROLOGY HOUSESTAFF IN ED 1/12/2022  MRI REVIEWED  SUNRISE NOTES REVIEWED  DISCUSSED WITH ED TEAM    Briefly, 40 yo RH woman with PMHx cervical disc disease, now with vomiting, night sweats, recent COVID infection (unvaccinated) who takes "lots of vitamins and supplements" had episode of nausea and vomiting prompting ED visit.    Neurological examination entirely normal.  AOx3.  No nystagmus.  No cerebellar dysmteria  no pronator drift.  No weakness    IMAGING  MRI 1/11/2022 - normal    IMPRESSION  DIZZINESS/VOMITING -- She may have had Benign positional vertigo which has now resolved. Alternative etiologies include GI distress, now resolved.    DISPO -- No objection to d/c planning. May follow-up with neurology outpatient clinic.

## 2022-01-11 NOTE — CONSULT NOTE ADULT - ASSESSMENT
A 41 year old female with PMH of double distectomy has presented to the ED with room spinning dizziness since 7 AM on 1/11/22 ([facility]). Dizziness gets worse when she sits up. Associated with nausea and hot sweats. Vomited twice. Pt denies any headache, nausea,  numbness, tingling, weakness, changes in vision or hearing, no changes in hearing, or jaw pain.  Pt found to have a UTI 3 days, currently on cephalexin. Had COVID 2 weeks ago not vaccinated. Pt is not a TPA candidate as she's outside window. Pt is not a thrombectomy candidate as no LVO seen on imaging.     NIHSS 0  preMRS 0    Neuro exam revealed increased reflexes in all. Otherwise normal. Unremarkable noncontrast brain CT. Normal CT perfusion. Normal CTA of the head and neck. No large vessel occlusion.  HINTS tests negative, abhi divine pike negative    Impression: room spinning dizziness may be 2/2 possible peripheral etiology including BPPV, less likely to be a stroke given age and no vascular risk factors.    Recommendations:   Core measures-  LDL: --  A1c: --  Statin: --  On Anticoagulation (for afib/aflutter)? --  On Antiplatelets? --    Recommendations:  -CDU with MRI to rule out stroke  -Neuro checks Q4  -Permissive HTN up to 220/110 for 24 hours from symptom onset, gradual normotension over 2-3 days  -PT/OT/Dysphagia screen  -Speech and swallow eval if dysphagia screen fails  -NPO except meds until dysphagia screen passed  -Head of bed > 30 degrees for aspiration prevention and aspiration precautions ordered.  -STAT CT head non-contrast for change in neuro exam.     Secondary prevention of stroke:  -Can start 81 mg ASA daily if MRI shows a stroke    -Atorvastatin 80 mg daily (long-term goal LDL < 70)  -Tight glucose control (long-term goal HgbA1c < 6%)    Stroke workup:  -Continuous  telemetry to monitor for arrhythmia  -Stroke labwork: HgbA1C, fasting lipid panel, CBC, CMP, coag pannel, troponin  - Baseline EKG    Prophylaxis: Lovenox 40 mg Sq daily unless contraindicated in which case SCDs   Fall precautions, aspiration precautions    Case discussed with stroke fellow Dr. Krishna Son under supervision of attending Dr. Nguyen     A 41 year old female with PMH of double distectomy has presented to the ED with room spinning dizziness since 7 AM on 1/11/22 ([facility]). Dizziness gets worse when she sits up. Associated with nausea and hot sweats. Vomited twice. Pt admits to pressure and cracking in both of her ear, unknown when it started. Pt denies any headache, nausea,  numbness, tingling, weakness, changes in vision or hearing, no changes in hearing, no ear or jaw pain.  Pt found to have a UTI 3 days, currently on cephalexin. Had COVID 2 weeks ago not vaccinated. Pt is not a TPA candidate as she's outside window. Pt is not a thrombectomy candidate as no LVO seen on imaging.     NIHSS 0  preMRS 0    Neuro exam revealed increased reflexes in all. Otherwise normal. Unremarkable noncontrast brain CT. Normal CT perfusion. Normal CTA of the head and neck. No large vessel occlusion.  HINTS tests negative, abhi divine pike negative    Impression: room spinning dizziness may be 2/2 possible peripheral vestibular etiology including BPPV vs. toxic/metabolic/infectious etiology, less likely to be a stroke given age and no vascular risk factors.    Recommendations:   Core measures-  LDL: --  A1c: --  Statin: --  On Anticoagulation (for afib/aflutter)? --  On Antiplatelets? --    Recommendations:  -CDU with MRI to rule out stroke  -Neuro checks Q4  -Permissive HTN up to 220/110 for 24 hours from symptom onset, gradual normotension over 2-3 days  -PT/OT/Dysphagia screen  -Speech and swallow eval if dysphagia screen fails  -NPO except meds until dysphagia screen passed  -Head of bed > 30 degrees for aspiration prevention and aspiration precautions ordered.  -STAT CT head non-contrast for change in neuro exam.   -Toxic/metabolic/infectious workup per primary team    Secondary prevention of stroke:  -Can start 81 mg ASA daily if MRI shows a stroke    -Atorvastatin 80 mg daily (long-term goal LDL < 70)  -Tight glucose control (long-term goal HgbA1c < 6%)    Stroke workup:  -Continuous  telemetry to monitor for arrhythmia  -Stroke labwork: HgbA1C, fasting lipid panel, CBC, CMP, coag pannel, troponin  - Baseline EKG    Prophylaxis: Lovenox 40 mg Sq daily unless contraindicated in which case SCDs   Fall precautions, aspiration precautions    Case discussed with stroke fellow Dr. Krishna Son under supervision of attending Dr. Nguyen     A 41 year old female with PMH of double distectomy has presented to the ED with room spinning dizziness since 7 AM on 1/11/22 ([facility]). Dizziness gets worse when she sits up. Associated with nausea and hot sweats. Vomited twice. Pt admits to pressure and cracking in both of her ear, unknown when it started. Pt denies any headache, nausea,  numbness, tingling, weakness, changes in vision or hearing, no changes in hearing, no ear or jaw pain.  Pt found to have a UTI 3 days, currently on cephalexin. Had COVID 2 weeks ago not vaccinated. Pt is not a TPA candidate as she's outside window. Pt is not a thrombectomy candidate as no LVO seen on imaging.     NIHSS 0  preMRS 0    Neuro exam revealed increased reflexes in all. Otherwise normal. Unremarkable noncontrast brain CT. Normal CT perfusion. Normal CTA of the head and neck. No large vessel occlusion.  HINTS tests negative, abhi divine pike negative    Impression: room spinning dizziness may be 2/2 possible peripheral vestibular etiology including BPPV vs. toxic/metabolic/infectious etiology, less likely to be a stroke given age and no vascular risk factors.    Recommendations:   Core measures-  LDL: --  A1c: --  Statin: --  On Anticoagulation (for afib/aflutter)? --  On Antiplatelets? --    Recommendations:  -CDU with MRI to rule out stroke  -Neuro checks Q4  -Permissive HTN up to 220/110 for 24 hours from symptom onset, gradual normotension over 2-3 days  -PT/OT/Dysphagia screen  -Speech and swallow eval if dysphagia screen fails  -NPO except meds until dysphagia screen passed  -Head of bed > 30 degrees for aspiration prevention and aspiration precautions ordered.  -STAT CT head non-contrast for change in neuro exam.   -Toxic/metabolic/infectious workup per primary team    Secondary prevention of stroke:  -Can start 81 mg ASA daily if MRI shows a stroke    -Atorvastatin 80 mg daily (long-term goal LDL < 70)  -Tight glucose control (long-term goal HgbA1c < 6%)    Stroke workup:  -Continuous  telemetry to monitor for arrhythmia  -Stroke labwork: HgbA1C, fasting lipid panel, CBC, CMP, coag pannel, troponin  - Baseline EKG    Prophylaxis: Lovenox 40 mg Sq daily unless contraindicated in which case SCDs   Fall precautions, aspiration precautions    Patient can follow up with general neurology at [address] 1-2 weeks after discharge. Please instruct the patient to call [phone] to schedule this appointment.     Case discussed with stroke fellow Dr. Krishna Son under supervision of attending Dr. Nguyen     A 41 year old female with PMH of double distectomy has presented to the ED with room spinning dizziness since 7 AM on 1/11/22 ([facility]). Dizziness gets worse when she sits up. Associated with nausea and hot sweats. Vomited twice. Pt admits to pressure and cracking in both of her ear, unknown when it started. Pt denies any headache, nausea,  numbness, tingling, weakness, changes in vision or hearing,  no ear or jaw pain.  Pt found to have a UTI 3 days, currently on cephalexin. Had COVID 2 weeks ago not vaccinated. Pt is not a TPA candidate as she's outside window. Pt is not a thrombectomy candidate as no LVO seen on imaging.     NIHSS 0  preMRS 0    Neuro exam revealed increased reflexes in all. Otherwise normal. Unremarkable noncontrast brain CT. Normal CT perfusion. Normal CTA of the head and neck. No large vessel occlusion.  HINTS tests negative, abhi divine pike negative    Impression: room spinning dizziness may be 2/2 possible peripheral vestibular etiology including BPPV vs. toxic/metabolic/infectious etiology, less likely to be a stroke given age and no vascular risk factors.    Recommendations:   Core measures-  LDL: --  A1c: --  Statin: --  On Anticoagulation (for afib/aflutter)? --  On Antiplatelets? --    Recommendations:  -CDU with MRI to rule out stroke  -Neuro checks Q4  -Permissive HTN up to 220/110 for 24 hours from symptom onset, gradual normotension over 2-3 days  -PT/OT/Dysphagia screen  -Speech and swallow eval if dysphagia screen fails  -NPO except meds until dysphagia screen passed  -Head of bed > 30 degrees for aspiration prevention and aspiration precautions ordered.  -STAT CT head non-contrast for change in neuro exam.   -Toxic/metabolic/infectious workup per primary team    Secondary prevention of stroke:  -Can start 81 mg ASA daily if MRI shows a stroke    -Atorvastatin 80 mg daily (long-term goal LDL < 70)  -Tight glucose control (long-term goal HgbA1c < 6%)    Stroke workup:  -Continuous  telemetry to monitor for arrhythmia  -Stroke labwork: HgbA1C, fasting lipid panel, CBC, CMP, coag pannel, troponin  - Baseline EKG    Prophylaxis: Lovenox 40 mg Sq daily unless contraindicated in which case SCDs   Fall precautions, aspiration precautions    Patient can follow up with general neurology at [address] [place] [address] 1-2 weeks after discharge. Please instruct the patient to call [phone] to schedule this appointment.     Case discussed with stroke fellow Dr. Krishna Son under supervision of attending Dr. Nguyen     A 41 year old female with PMH of C5-C6 and C6-C7 total disc replacement 2/2 cervical radiculopathy has presented to the ED with room spinning dizziness since 7 AM on 1/11/22 ([facility]). Dizziness gets worse when she sits up. Associated with nausea and hot sweats. Vomited twice. Pt admits to pressure and cracking in both of her ear, unknown when it started. Pt denies any headache, nausea,  numbness, tingling, weakness, changes in vision or hearing,  no ear or jaw pain.  Pt found to have a UTI 3 days, currently on cephalexin. Had COVID 2 weeks ago not vaccinated. Pt is not a TPA candidate as she's outside window. Pt is not a thrombectomy candidate as no LVO seen on imaging.     NIHSS 0  preMRS 0    Neuro exam revealed increased reflexes in all. Otherwise normal. Unremarkable noncontrast brain CT. Normal CT perfusion. Normal CTA of the head and neck. No large vessel occlusion.  HINTS tests negative, abhi divine pike negative    Impression: room spinning dizziness may be 2/2 possible peripheral vestibular etiology including BPPV vs. toxic/metabolic/infectious etiology, less likely to be a stroke given age and no vascular risk factors.    Recommendations:   Core measures-  LDL: --  A1c: --  Statin: --  On Anticoagulation (for afib/aflutter)? --  On Antiplatelets? --    Recommendations:  -CDU with MRI to rule out stroke  -Neuro checks Q4  -Permissive HTN up to 220/110 for 24 hours from symptom onset, gradual normotension over 2-3 days  -PT/OT/Dysphagia screen  -Speech and swallow eval if dysphagia screen fails  -NPO except meds until dysphagia screen passed  -Head of bed > 30 degrees for aspiration prevention and aspiration precautions ordered.  -STAT CT head non-contrast for change in neuro exam.   -Toxic/metabolic/infectious workup per primary team    Secondary prevention of stroke:  -Can start 81 mg ASA daily if MRI shows a stroke    -Atorvastatin 80 mg daily (long-term goal LDL < 70)  -Tight glucose control (long-term goal HgbA1c < 6%)    Stroke workup:  -Continuous  telemetry to monitor for arrhythmia  -Stroke labwork: HgbA1C, fasting lipid panel, CBC, CMP, coag pannel, troponin  - Baseline EKG    Prophylaxis: Lovenox 40 mg Sq daily unless contraindicated in which case SCDs   Fall precautions, aspiration precautions    Patient can follow up with general neurology at [address] [place] [address] 1-2 weeks after discharge. Please instruct the patient to call [phone] to schedule this appointment.     Case discussed with stroke fellow Dr. Krishna Son under supervision of attending Dr. Nguyen

## 2022-01-11 NOTE — ED CDU PROVIDER INITIAL DAY NOTE - PROGRESS NOTE DETAILS
CDU PROGRESS NOTE ANDREY YU: Pt resting comfortably, NAD, VSS. No events on telemetry. Pt is aox3, speaking coherently. No focal neuro deficits. Will continue to monitor, MRI in am.

## 2022-01-11 NOTE — ED ADULT NURSE NOTE - BEFAST LAST WELL KNOWN
Patient placed on continuous non-transmitting sat monitor  HS per protocol. Monitor history deleted prior to placing on patient.  Patient working on IS 
 
 
 Known

## 2022-01-11 NOTE — ED CDU PROVIDER INITIAL DAY NOTE - DETAILS
41 year old female with PShx of double distectomy presented to the ED with room spinning dizziness since 7 AM, code stoke  Plan: frequent reeval, vitals q 4hrs, tele, neuro checks, MRI brain w/o contrast.

## 2022-01-11 NOTE — ED ADULT NURSE NOTE - NSIMPLEMENTINTERV_GEN_ALL_ED
Implemented All Fall Risk Interventions:  [place] to call system. Call bell, personal items and telephone within reach. Instruct patient to call for assistance. Room bathroom lighting operational. Non-slip footwear when patient is off stretcher. Physically safe environment: no spills, clutter or unnecessary equipment. Stretcher in lowest position, wheels locked, appropriate side rails in place. Provide visual cue, wrist band, yellow gown, etc. Monitor gait and stability. Monitor for mental status changes and reorient to person, place, and time. Review medications for side effects contributing to fall risk. Reinforce activity limits and safety measures with patient and family.

## 2022-01-11 NOTE — ED CDU PROVIDER INITIAL DAY NOTE - OBJECTIVE STATEMENT
Pt is 41 year old female with PShx of double distectomy presented to the ED with room spinning dizziness since 7 AM on 1/11/22 ([facility]). Dizziness gets worse when she sits up. Associated with nausea and hot sweats. Vomited twice. Pt admits to pressure and cracking in both of her ear, unknown when it started. Pt denies any headache, nausea,  numbness, tingling, weakness, changes in vision or hearing, no changes in hearing, or jaw pain.  Pt found to have a UTI 3 days, currently on cephalexin. Had COVID 2 weeks ago not vaccinated.   In ED, patient was evaluated by Neurology as code stroke. Labs were unremarkable and CT head/CT angio head/neck showed no signs of acute pathology. Pt sent to CDU for frequent reeval, vitals q 4hrs, tele, neuro checks, MRI brain w/o contrast.

## 2022-01-11 NOTE — ED PROVIDER NOTE - OBJECTIVE STATEMENT
40 yo F, no PMH, p/w sudden onset of dizziness. States that she was in her kitchen preparing a meal and started feeling room spinning and off balance. Patient describes as "vertigo" symptoms. Denies hx of similar symptoms in the past. States that the symptoms are worse with movement. Denies headache, recent trauma, n/v, f/c.

## 2022-01-11 NOTE — ED CDU PROVIDER INITIAL DAY NOTE - MEDICAL DECISION MAKING DETAILS
Attending MD Madsen : Patient in CDU for MRI per neuro recs for intermittent dizziness with normal neuro exam.

## 2022-01-11 NOTE — ED PROVIDER NOTE - PHYSICAL EXAMINATION
Attending MD Madsen :   PHYSICAL EXAM:    GENERAL: NAD  HEENT:  Atraumatic  CHEST/LUNG: Chest rise equal bilaterally  HEART: Regular rate and rhythm  ABDOMEN: Soft, Nontender, Nondistended  EXTREMITIES:  Extremities warm  PSYCH: A&Ox3  SKIN: No obvious rashes or lesions  NEUROLOGY: strength and sensation intact in all extremities. CN 2 - 12 intact. No pronator drift. Ambulatory with wide based gait, mildly unsteady.

## 2022-01-11 NOTE — ED CDU PROVIDER DISPOSITION NOTE - NSFOLLOWUPINSTRUCTIONS_ED_ALL_ED_FT
(1) You will need to follow up with your PMD and our recommended neurologist (____) in 2-3 days for your _____. A copy of your results were given to you to bring to your appt.  (2) Continue your home medications as directed.  (3) Drink plenty of fluids to stay hydrated.  (4) Read attached discharge paperwork.  (5) Return to ER for headache, confusion, behavior/speech changes, numbness/tingling/weakness in your arms/legs, or any other concerns. 1. stay hydrated. rest.   2. take meclizine 1 tab every 8hrs for dizziness as needed. Take zofran 1 tab every 8hrs for nausea as needed.   3. Follow up with your PCP in 1 -2 days.  4. Follow up with Neurology #[phone] in 1 week for dizziness. Can start you on vestibular rehab if needed.   5. Bring Printed Results to your Doctor Visits. Stroke Education given to you.    **your cholesterol was high here, please follow low cholesterol diet, follow this up with your Primary Care Physician****    6. Return if symptoms worsen, fever, weakness, numbness, dizziness, vision change, slurred speech and all other concerns          Stroke Prevention    AMBULATORY CARE:    Stroke prevention includes making lifestyle changes and managing health conditions that can lead to a stroke. Your healthcare provider can help you create a plan that will be specific to your needs.    Know your risk for a stroke: You cannot control some risk factors. Examples are being older than 55 or , or having a family history of stroke. You can take action for any of the following risk factors:   •A personal history of transient ischemic attack (TIA)      •Diabetes or high cholesterol      •Atrial fibrillation, high blood pressure, or heart disease      •Smoking cigarettes, drinking alcohol, or using drugs such as cocaine      •Obesity or not enough physical activity      •Taking birth control pills, especially in women older than 35 who smoke cigarettes      Medicines to help prevent a stroke depend on your stroke risk factors. Your healthcare provider may recommend any of the following:  •Blood thinners help prevent blood clots. Clots can cause strokes, heart attacks, and death. The following are general safety guidelines to follow while you are taking a blood thinner:?Watch for bleeding and bruising while you take blood thinners. Watch for bleeding from your gums or nose. Watch for blood in your urine and bowel movements. Use a soft washcloth on your skin, and a soft toothbrush to brush your teeth. This can keep your skin and gums from bleeding. If you shave, use an electric shaver. Do not play contact sports.       ?Tell your dentist and other healthcare providers that you take a blood thinner. Wear a bracelet or necklace that says you take this medicine.       ?Do not start or stop any other medicines unless your healthcare provider tells you to. Many medicines cannot be used with blood thinners.      ?Take your blood thinner exactly as prescribed by your healthcare provider. Do not skip does or take less than prescribed. Tell your provider right away if you forget to take your blood thinner, or if you take too much.      ?Warfarin is a blood thinner that you may need to take. The following are things you should be aware of if you take warfarin: ?Foods and medicines can affect the amount of warfarin in your blood. Do not make major changes to your diet while you take warfarin. Warfarin works best when you eat about the same amount of vitamin K every day. Vitamin K is found in green leafy vegetables and certain other foods. Ask for more information about what to eat when you are taking warfarin.      ?You will need to see your healthcare provider for follow-up visits when you are on warfarin. You will need regular blood tests. These tests are used to decide how much medicine you need.         •Blood pressure (BP) medicines may be given to help control hypertension. Antihypertensives can help lower your BP and keep it within your recommended range. Diuretics help decrease extra fluid that collects in your body. This helps lower your BP. Medicine may also help lower your cholesterol level. A low cholesterol level helps prevent heart disease and makes it easier to control your blood pressure.      Lifestyle changes that can help prevent a stroke:     Prevent Heart Disease      •Stay active. Aim to get physical activity for 30 minutes a day, on most days of the week. You can break activity into 10 minute periods, 3 times during the day. Activity can lower your risk for problems that can lead to a stroke. Activity can control you blood pressure, cholesterol, weight, and blood sugar levels. Find an exercise that you enjoy. This will make it easier for you to reach your exercise goals.  Ways to Be Physically Active       Strength Training for Adults           •Limit sodium (salt) as directed. Too much sodium can affect your fluid balance. Check labels to find low-sodium or no-salt-added foods. Some low-sodium foods use potassium salts for flavor. Too much potassium can also cause health problems. Your healthcare provider will tell you how much sodium and potassium are safe for you to have in a day. He or she may recommend that you limit sodium to 2,300 mg a day.             •Follow the meal plan recommended by your healthcare provider. A dietitian or your provider can give you more information on low-sodium plans or the DASH (Dietary Approaches to Stop Hypertension) eating plan. The DASH plan is low in sodium, processed sugar, unhealthy fats, and total fat. It is high in potassium, calcium, and fiber. These can be found in vegetables, fruit, and whole-grain foods.             •Maintain a healthy weight. Being overweight or obese can increase your risk for a stroke. Ask your healthcare provider what a healthy weight is for you. Ask him or her to help you create a weight loss plan if you are overweight. He or she can help you create small goals if you have a lot of weight to lose.  Weight Checks LATOYA           •Talk to your healthcare provider about alcohol. Alcohol can raise your blood pressure. The recommended limit is 2 drinks in a day for men and 1 drink in a day for women. Do not binge drink or save a week's worth of alcohol to drink in 1 or 2 days. Limit weekly amounts as directed by your provider.      •Do not smoke. Nicotine and other chemicals in cigarettes and cigars can cause lung and heart damage. Heart and lung damage can increase your risk for a stroke. Ask your healthcare provider for information if you currently smoke and need help to quit. E-cigarettes or smokeless tobacco still contain nicotine. Talk to your healthcare provider before you use these products.      •Do not use illegal drugs. Cocaine and other illegal drugs can cause a stroke. Talk to your healthcare provider if you currently use illegal drugs and need help to quit.      •Manage stress. Stress can raise your blood pressure. Find ways to relax, such as deep breathing or listening to music.      Manage health conditions that can lead to a stroke:   •Manage your blood pressure (BP): ?Get regular BP screening. Screening can help find high BP early to lower your risk for a stroke. Your healthcare provider will give you directions to lower and control your BP.  Blood Pressure Readings           ?Check your BP as directed. You can monitor your blood pressure at home. Ask your healthcare provider how often to check your blood pressure and what your blood pressure should be. Tell your healthcare provider if your blood pressure is higher than what he or she says it should be. He or she may need to change your medicine or help you make changes to your nutrition or exercise plan.   How to take a Blood Pressure           •Manage diabetes. Good control of your blood sugar levels may decrease your risk for a stroke. If you take diabetes medicine or insulin, take it as directed. Healthy foods and regular exercise also help lower your blood sugar levels. Monitor your levels as directed. Keep a record of your blood sugar levels and bring them to your appointments. This will help your healthcare provider make changes to your medicines. It may also help you find ways to get better control of your diabetes.  How to check your blood sugar           •Manage sleep apnea. Sleep apnea can cause stroke risk factors, such as high blood pressure, heart failure, or heart rhythm problems. Get screened and treated for sleep apnea. Talk to your healthcare provider about devices that help prevent complications from sleep apnea. You may need to use a CPAP or BiPAP machine while you sleep. These machines increase your oxygen levels and keep your airway open.  CPAP           •Manage other medical conditions that increase your risk for a stroke. Atrial fibrillation (a-fib) is an abnormal heart rhythm that can cause blood clots. Medicines or procedures may be used to control a-fib. Patent foramen ovale (PFO) can also lead to a stroke. Your healthcare provider may recommend you have surgery to close a PFO, if needed. Sickle cell disease, or sickle cell anemia, can cause blockages in blood vessels. The blockages may need to be removed during surgery. Depression and anxiety can stress your heart. Stress may lead to high blood pressure or heart disease. Talk to your healthcare provider about treatment for depression or anxiety.      •Talk to your healthcare provider about risk factors for women. Birth control pills increase your risk, especially if you are older than 35 or smoke cigarettes. Talk to your healthcare provider about other forms of contraception. Estrogen levels drop during menopause. Low estrogen levels may increase your risk for stroke. Talk to your healthcare provider about hormone replacement therapy to reduce your risk for a stroke.      Follow up with your doctor or neurologist as directed: You may need a CT or MRI of your brain to check for problems that may cause a stroke. Write down your questions so you remember to ask them during your visits.       © Copyright [employer] 2022           back to top                          © Copyright [employer] 2022               Benign Paroxysmal Positional Vertigo    WHAT YOU NEED TO KNOW:    BPPV is an inner ear condition that causes you to suddenly feel dizzy. Benign means it is not serious or life-threatening. BPPV is caused by a problem with the nerves and structure of your inner ear. BPPV happens when small pieces of calcium break loose and lump together in one of your inner ear canals.     Ear Anatomy         DISCHARGE INSTRUCTIONS:    Seek care immediately if:   •You fall during a BPPV episode and are injured.      •You have a severe headache that does not go away.      •You have new changes in your vision or feel weak or confused.      •You have problems hearing, or you have ringing or buzzing in your ears.      Contact your healthcare provider if:   •Your BPPV symptoms do not go away or they return.      •You have problems with your balance, or you are falling often.      •You have new or increased nausea or vomiting with vertigo.      •You feel anxious or depressed and do not want to leave your home.      •You have questions or concerns about your condition or care.      Medicines:   •Medicines may be recommended or prescribed to treat dizziness or nausea.      •Take your medicine as directed. Contact your healthcare provider if you think your medicine is not helping or if you have side effects. Tell him of her if you are allergic to any medicine. Keep a list of the medicines, vitamins, and herbs you take. Include the amounts, and when and why you take them. Bring the list or the pill bottles to follow-up visits. Carry your medicine list with you in case of an emergency.      Prevent your symptoms:   •Try to avoid sudden head movements. Stand up and lie down slowly.       •Raise and support your head when you lie down. Place pillows under your upper back and head or rest in a recliner.       •Change your position often when you are lying down. Try not to lie with your head on the same side for long periods of time. Roll over slowly.       •Wear protective gear when you ride a bike or play sports. A helmet helps protect your head from injury.      Follow up with your healthcare provider as directed: You may need to return in 1 month to check the progress of your treatment. Write down your questions so you remember to ask them during your visits.        © Copyright [employer] 2022           back to top                          © Copyright [employer] 2022

## 2022-01-11 NOTE — ED CDU PROVIDER DISPOSITION NOTE - ATTENDING CONTRIBUTION TO CARE
Stent catheter removed. Stent deployed. acute vertigo - central vs peripheral - MRI brain, neuro checks, Neuro consult, reassess.

## 2022-01-11 NOTE — ED PROVIDER NOTE - CLINICAL SUMMARY MEDICAL DECISION MAKING FREE TEXT BOX
Attending MD Madsen : 41 F hx discectomy cervical p/w intermittent dizziness since this am that patient describes as 'vertigo'. Patient's description of symptoms extremely suggestive of peripheral vertigo, given episodic nature, onset when moving, normal neuro exam otherwise aside from gait. However, per neuro resident at bedside, will obtain full stroke CT workup under code stroke. Will follow-up tests, give meclizine and closely reassess. EKG to assess for arrythmia, labs to eval lyte abnormality, hcg to assess for pregnancy.

## 2022-01-11 NOTE — ED CDU PROVIDER DISPOSITION NOTE - CLINICAL COURSE
Pt is 41 year old female with PShx of double distectomy presented to the ED with room spinning dizziness since 7 AM on 1/11/22 ([facility]). Dizziness gets worse when she sits up. Associated with nausea and hot sweats. Vomited twice. Pt admits to pressure and cracking in both of her ear, unknown when it started. Pt denies any headache, nausea,  numbness, tingling, weakness, changes in vision or hearing, no changes in hearing, or jaw pain.  Pt found to have a UTI 3 days, currently on cephalexin. Had COVID 2 weeks ago not vaccinated.   In ED, patient was evaluated by Neurology as code stroke. Labs were unremarkable and CT head/CT angio head/neck showed no signs of acute pathology. Pt sent to CDU for frequent reeval, vitals q 4hrs, tele, neuro checks, MRI brain w/o contrast. Pt is 41 year old female with PShx of double distectomy presented to the ED with room spinning dizziness since 7 AM on 1/11/22 ([facility]). Dizziness gets worse when she sits up. Associated with nausea and hot sweats. Vomited twice. Pt admits to pressure and cracking in both of her ear, unknown when it started. Pt denies any headache, nausea,  numbness, tingling, weakness, changes in vision or hearing, no changes in hearing, or jaw pain.  Pt found to have a UTI 3 days, currently on cephalexin. Had COVID 2 weeks ago not vaccinated.   In ED, patient was evaluated by Neurology as code stroke. Labs were unremarkable and CT head/CT angio head/neck showed no signs of acute pathology. Pt sent to CDU for frequent reeval, vitals q 4hrs, tele, neuro checks, MRI brain w/o contrast.  pt did well in cdu, no events on tele. MRI normal. seen by neuro attending, ok to d/c home to f/up outpt

## 2022-01-11 NOTE — CONSULT NOTE ADULT - SUBJECTIVE AND OBJECTIVE BOX
HPI: A 41 year old female with PMH of double distectomy has presented to the ED with room spinning dizziness since 7 AM on 1/11/22 (LKW). Not positional. Vomited twice and with blurry vision. Pt denies any headache, numbness, tingling, weakness,       ROS: A 10-system ROS was performed and is negative except for those items noted above and/or in the HPI.    PAST MEDICAL & SURGICAL HISTORY:  Cervical radiculopathy    Cervical herniated disc    Depression    History of recent dental procedure  Root canal on 1/12/21    H/O oral surgery    Status post anal fissurectomy  2019      FAMILY HISTORY:      SOCIAL HISTORY: SOCIAL HISTORY:     Marital Status: (  )   (  ) Single  (  )   (  )      Occupation:      Lives: (  ) alone  (  ) with children   (  ) with spouse  (  ) with parents  (  ) other     Illicit Drug Use: (  ) never used  (  ) other _____     Tobacco Use:  (  ) never smoked  (  ) former smoker  (  ) current smoker  (  ) pack year  (  ) last cigarette date     Alcohol Use:      Sexual History:        MEDICATIONS  Home Medications:  acetaminophen 325 mg oral tablet: 3 tab(s) orally every 6 hours (29 Jan 2021 10:07)      MEDICATIONS  (STANDING):  meclizine 50 milliGRAM(s) Oral Once    MEDICATIONS  (PRN):      ALLERGIES/INTOLERANCES:  Allergies  No Known Allergies    Intolerances      OBJECTIVE:  VITALS   Vital Signs Last 24 Hrs  T(C): 36.8 (11 Jan 2022 16:02), Max: 36.8 (11 Jan 2022 16:02)  T(F): 98.3 (11 Jan 2022 16:02), Max: 98.3 (11 Jan 2022 16:02)  HR: 72 (11 Jan 2022 16:02) (72 - 72)  BP: 127/87 (11 Jan 2022 16:02) (127/87 - 127/87)  BP(mean): --  RR: 18 (11 Jan 2022 16:02) (18 - 18)  SpO2: 100% (11 Jan 2022 16:02) (100% - 100%)    PHYSICAL EXAM:  Neurological Exam:  MS: Awake, alert, oriented to person, place, situation, time. Normal affect. Follows all commands.    Language: Speech is clear, fluent with good repetition & comprehension (able to name objects___)    CNs: PERRLA (R = 3mm, L = 3mm). VFF. EOMI no nystagmus, no diplopia. V1-3 intact to LT/pinprick, well developed masseter muscles b/l. No facial asymmetry b/l, Hearing grossly normal (rubbing fingers) b/l. Symmetric palate elevation in midline. Gag reflex deferred. Head turning & shoulder shrug intact b/l. Tongue midline, normal movements, no atrophy.    Fundoscopic: pale w/ sharp discs margins No vascular changes.      Motor: Normal muscle bulk & tone. No noticeable tremor or seizure. No pronator drift.              Deltoid	Biceps	Triceps 	   R	5	5	5	5	 	  L	5	5	5	5			    	H-Flex	H-Ext	K-Flex	K-Ext	D-Flex	P-Flex  R	5	5	5	5	5	5		   L	5	5	5	5	5	5		     Sensation: Intact to LT/PP/Temp/Vibration/Position b/l throughout.     Cortical: Extinction on DSS (neglect): none    Reflexes:              Biceps(C5)       BR(C6)     Triceps(C7)               Patellar(L4)    Achilles(S1)    Plantar Resp  R	2	          2	             2		        2		    2		Down   L	2	          2	             2		        2		    2		Down     Coordination: intact rapid-alt movements. No dysmetria to FTN/HTS    Gait:  Normal Romberg. No postural instability. Normal stance and tandem gait.     LABORATORY:  CBC                       12.2   6.08  )-----------( 325      ( 11 Jan 2022 16:28 )             37.0     Chem       LFTs   Coagulopathy   Lipid Panel   A1c   Cardiac enzymes     U/A   CSF  Immunological  Other    STUDIES & IMAGING:  Studies (EKG, EEG, EMG, etc):     Radiology (XR, CT, MR, U/S, TTE/INGRID): HPI: A 41 year old female with PMH of double distectomy has presented to the ED with room spinning dizziness since 7 AM on 1/11/22 (LKW). Dizziness gets worse when she sits up. Associated with nausea and hot sweats. Vomited twice. Pt denies any headache, nausea,  numbness, tingling, weakness, changes in vision or hearing, no changes in hearing, or jaw pain.  Pt found to have a UTI 3 days, currently on cephalexin. Had COVID 2 weeks ago not vaccinated. Pt is not a TPA candidate as she's outside window. Pt is not a thrombectomy candidate as no LVO seen on imaging.     NIHSS 0  preMRS 0      ROS: A 10-system ROS was performed and is negative except for those items noted above and/or in the HPI.    PAST MEDICAL & SURGICAL HISTORY:  Cervical radiculopathy    Cervical herniated disc    Depression    History of recent dental procedure  Root canal on 1/12/21    H/O oral surgery    Status post anal fissurectomy  2019      FAMILY HISTORY:      SOCIAL HISTORY: SOCIAL HISTORY:     Marital Status: (  )   (  ) Single  (  )   (  )      Occupation:      Lives: (  ) alone  (  ) with children   (  ) with spouse  (  ) with parents  (  ) other     Illicit Drug Use: (  ) never used  (  ) other _____     Tobacco Use:  (  ) never smoked  (  ) former smoker  (  ) current smoker  (  ) pack year  (  ) last cigarette date     Alcohol Use:      Sexual History:        MEDICATIONS  Home Medications:  acetaminophen 325 mg oral tablet: 3 tab(s) orally every 6 hours (29 Jan 2021 10:07)      MEDICATIONS  (STANDING):  meclizine 50 milliGRAM(s) Oral Once    MEDICATIONS  (PRN):      ALLERGIES/INTOLERANCES:  Allergies  No Known Allergies    Intolerances      OBJECTIVE:  VITALS   Vital Signs Last 24 Hrs  T(C): 36.8 (11 Jan 2022 16:02), Max: 36.8 (11 Jan 2022 16:02)  T(F): 98.3 (11 Jan 2022 16:02), Max: 98.3 (11 Jan 2022 16:02)  HR: 72 (11 Jan 2022 16:02) (72 - 72)  BP: 127/87 (11 Jan 2022 16:02) (127/87 - 127/87)  BP(mean): --  RR: 18 (11 Jan 2022 16:02) (18 - 18)  SpO2: 100% (11 Jan 2022 16:02) (100% - 100%)    PHYSICAL EXAM:  Neurological Exam:  MS: Awake, alert, oriented to person, place, situation, time. Normal affect. Follows all commands.    Language: Speech is clear, fluent with good repetition & comprehension (able to name objects_watch__)    CNs: PERRLA (R = 3mm, L = 3mm). VFF. EOMI no nystagmus, no diplopia. V1-3 intact to LT/pinprick, well developed masseter muscles b/l. No facial asymmetry b/l, Hearing grossly normal (rubbing fingers) b/l. Symmetric palate elevation in midline. Gag reflex deferred. Head turning & shoulder shrug intact b/l. Tongue midline, normal movements, no atrophy.    HINTS test negative, abhi divine pike negative    Motor: Normal muscle bulk & tone. No noticeable tremor or seizure. No pronator drift.              Deltoid	Biceps	Triceps 	   R	5	5	5	5	 	  L	5	5	5	5			    	H-Flex	H-Ext	K-Flex	K-Ext	D-Flex	P-Flex  R	5	5	5	5	5	5		   L	5	5	5	5	5	5		     Sensation: Intact to LT/PP/Temp/Vibration/Position b/l throughout.     Reflexes:              Biceps(C5)       BR(C6)     Triceps(C7)               Patellar(L4)    Achilles(S1)    Plantar Resp  R	3	          3             3		        3		    3		Down   L	3	          3             3		        3		    3		Down     Coordination: intact rapid-alt movements. No dysmetria to FTN/HTS    Gait:  Normal Romberg. No postural instability. Normal stance and tandem gait.     LABORATORY:  CBC                       12.2   6.08  )-----------( 325      ( 11 Jan 2022 16:28 )             37.0     Chem       LFTs   Coagulopathy   Lipid Panel   A1c   Cardiac enzymes     U/A   CSF  Immunological  Other    STUDIES & IMAGING:  Studies (EKG, EEG, EMG, etc):     Radiology (XR, CT, MR, U/S, TTE/INGRID):  Unremarkable noncontrast brain CT. Normal CT perfusion.   Normal CTA of the head and neck. No large vessel occlusion.   Patient is a 81y old  Male who presents with a chief complaint of hypotension (21 Mar 2021 10:23)      SUBJECTIVE / OVERNIGHT EVENTS:  Patient calm and has no complaints. Pt with cough but no SOB or chest pain. No n/v or abdominal pain. Pt with adequate O2 sat's on RA.     MEDICATIONS  (STANDING):  atorvastatin 10 milliGRAM(s) Oral at bedtime  carbidopa/levodopa  25/100 1 Tablet(s) Oral three times a day  dexAMETHasone  Injectable 6 milliGRAM(s) IV Push daily  dextrose 40% Gel 15 Gram(s) Oral once  dextrose 5%. 1000 milliLiter(s) (50 mL/Hr) IV Continuous <Continuous>  dextrose 5%. 1000 milliLiter(s) (100 mL/Hr) IV Continuous <Continuous>  dextrose 50% Injectable 25 Gram(s) IV Push once  dextrose 50% Injectable 12.5 Gram(s) IV Push once  dextrose 50% Injectable 25 Gram(s) IV Push once  enoxaparin Injectable 40 milliGRAM(s) SubCutaneous daily  glucagon  Injectable 1 milliGRAM(s) IntraMuscular once  insulin lispro (ADMELOG) corrective regimen sliding scale   SubCutaneous three times a day before meals  insulin lispro (ADMELOG) corrective regimen sliding scale   SubCutaneous at bedtime  losartan 25 milliGRAM(s) Oral daily  melatonin 3 milliGRAM(s) Oral at bedtime  metoprolol succinate ER 50 milliGRAM(s) Oral daily  pantoprazole    Tablet 40 milliGRAM(s) Oral every 12 hours  piperacillin/tazobactam IVPB.. 3.375 Gram(s) IV Intermittent every 8 hours  QUEtiapine 50 milliGRAM(s) Oral at bedtime  remdesivir  IVPB 100 milliGRAM(s) IV Intermittent every 24 hours  remdesivir  IVPB   IV Intermittent   tamsulosin 0.4 milliGRAM(s) Oral at bedtime    MEDICATIONS  (PRN):  benzonatate 100 milliGRAM(s) Oral three times a day PRN Cough      Vital Signs Last 24 Hrs  T(C): 36.7 (21 Mar 2021 21:57), Max: 36.9 (21 Mar 2021 02:29)  T(F): 98 (21 Mar 2021 21:57), Max: 98.4 (21 Mar 2021 02:29)  HR: 80 (21 Mar 2021 21:57) (63 - 80)  BP: 149/70 (21 Mar 2021 21:57) (120/64 - 149/70)  BP(mean): --  RR: 18 (21 Mar 2021 21:57) (18 - 20)  SpO2: 100% (21 Mar 2021 21:57) (94% - 100%)  CAPILLARY BLOOD GLUCOSE      POCT Blood Glucose.: 215 mg/dL (21 Mar 2021 21:31)  POCT Blood Glucose.: 189 mg/dL (21 Mar 2021 18:40)  POCT Blood Glucose.: 178 mg/dL (21 Mar 2021 12:58)  POCT Blood Glucose.: 117 mg/dL (21 Mar 2021 09:00)    I&O's Summary        PHYSICAL EXAM  GENERAL: NAD, elderly man laying comfortably in bed, speaking in full sentences on RA  CHEST/LUNG: Crackles in right mid and lower lung fields; No BS at mid to lower lung fields on the left; No wheezes. Normal respiratory effort  HEART: Regular rate and rhythm  ABDOMEN: Soft, Nontender, Nondistended  EXTREMITIES:  2+ Peripheral Pulses, No clubbing, cyanosis, or edema  PSYCH: Alert, oriented to self and place. Cooperative, follows simple commands and pleasant    LABS:                        8.8    7.33  )-----------( 425      ( 21 Mar 2021 08:28 )             28.1     03-21    143  |  105  |  28<H>  ----------------------------<  76  4.1   |  28  |  0.78    Ca    8.5      21 Mar 2021 08:28  Phos  2.9     03-21  Mg     1.3     03-21    TPro  5.7<L>  /  Alb  2.3<L>  /  TBili  0.5  /  DBili  x   /  AST  23  /  ALT  7   /  AlkPhos  85  03-21             HPI: A 41 year old female with PMH of double distectomy has presented to the ED with room spinning dizziness since 7 AM on 1/11/22 (LKW). Dizziness gets worse when she sits up. Associated with nausea and hot sweats. Vomited twice. Pt admits to pressure and cracking in both of her ear, unknown when it started. Pt denies any headache, nausea,  numbness, tingling, weakness, changes in vision or hearing, no changes in hearing, or jaw pain.  Pt found to have a UTI 3 days, currently on cephalexin. Had COVID 2 weeks ago not vaccinated. Pt is not a TPA candidate as she's outside window. Pt is not a thrombectomy candidate as no LVO seen on imaging.     NIHSS 0  preMRS 0      ROS: A 10-system ROS was performed and is negative except for those items noted above and/or in the HPI.    PAST MEDICAL & SURGICAL HISTORY:  Cervical radiculopathy    Cervical herniated disc    Depression    History of recent dental procedure  Root canal on 1/12/21    H/O oral surgery    Status post anal fissurectomy  2019      FAMILY HISTORY:      SOCIAL HISTORY: SOCIAL HISTORY:     Marital Status: (  )   (  ) Single  (  )   (  )      Occupation:      Lives: (  ) alone  (  ) with children   (  ) with spouse  (  ) with parents  (  ) other     Illicit Drug Use: (  ) never used  (  ) other _____     Tobacco Use:  (  ) never smoked  (  ) former smoker  (  ) current smoker  (  ) pack year  (  ) last cigarette date     Alcohol Use:      Sexual History:        MEDICATIONS  Home Medications:  acetaminophen 325 mg oral tablet: 3 tab(s) orally every 6 hours (29 Jan 2021 10:07)      MEDICATIONS  (STANDING):  meclizine 50 milliGRAM(s) Oral Once    MEDICATIONS  (PRN):      ALLERGIES/INTOLERANCES:  Allergies  No Known Allergies    Intolerances      OBJECTIVE:  VITALS   Vital Signs Last 24 Hrs  T(C): 36.8 (11 Jan 2022 16:02), Max: 36.8 (11 Jan 2022 16:02)  T(F): 98.3 (11 Jan 2022 16:02), Max: 98.3 (11 Jan 2022 16:02)  HR: 72 (11 Jan 2022 16:02) (72 - 72)  BP: 127/87 (11 Jan 2022 16:02) (127/87 - 127/87)  BP(mean): --  RR: 18 (11 Jan 2022 16:02) (18 - 18)  SpO2: 100% (11 Jan 2022 16:02) (100% - 100%)    PHYSICAL EXAM:  Neurological Exam:  MS: Awake, alert, oriented to person, place, situation, time. Normal affect. Follows all commands.    Language: Speech is clear, fluent with good repetition & comprehension (able to name objects_watch__)    CNs: PERRLA (R = 3mm, L = 3mm). VFF. EOMI no nystagmus, no diplopia. V1-3 intact to LT/pinprick, well developed masseter muscles b/l. No facial asymmetry b/l, Hearing grossly normal (rubbing fingers) b/l. Symmetric palate elevation in midline. Gag reflex deferred. Head turning & shoulder shrug intact b/l. Tongue midline, normal movements, no atrophy.    HINTS test negative, abhi divine pike negative    Motor: Normal muscle bulk & tone. No noticeable tremor or seizure. No pronator drift.              Deltoid	Biceps	Triceps 	   R	5	5	5	5	 	  L	5	5	5	5			    	H-Flex	H-Ext	K-Flex	K-Ext	D-Flex	P-Flex  R	5	5	5	5	5	5		   L	5	5	5	5	5	5		     Sensation: Intact to LT/PP/Temp/Vibration/Position b/l throughout.     Reflexes:              Biceps(C5)       BR(C6)     Triceps(C7)               Patellar(L4)    Achilles(S1)    Plantar Resp  R	3	          3             3		        3		    3		Down   L	3	          3             3		        3		    3		Down     Coordination: intact rapid-alt movements. No dysmetria to FTN/HTS    Gait:  Normal Romberg. No postural instability. Normal stance and tandem gait.     LABORATORY:  CBC                       12.2   6.08  )-----------( 325      ( 11 Jan 2022 16:28 )             37.0     Chem       LFTs   Coagulopathy   Lipid Panel   A1c   Cardiac enzymes     U/A   CSF  Immunological  Other    STUDIES & IMAGING:  Studies (EKG, EEG, EMG, etc):     Radiology (XR, CT, MR, U/S, TTE/INGRID):  Unremarkable noncontrast brain CT. Normal CT perfusion.   Normal CTA of the head and neck. No large vessel occlusion.   HPI: A 41 year old female with PMH of double distectomy has presented to the ED with room spinning dizziness since 7 AM on 1/11/22 (LKW). Dizziness gets worse when she sits up. Associated with nausea and hot sweats. Vomited twice. Pt admits to pressure and cracking in both of her ear, unknown when it started. Pt denies any headache, nausea,  numbness, tingling, weakness, changes in vision, ear or jaw pain.  Pt found to have a UTI 3 days, currently on cephalexin. Had COVID 2 weeks ago not vaccinated. Pt is not a TPA candidate as she's outside window. Pt is not a thrombectomy candidate as no LVO seen on imaging.     NIHSS 0  preMRS 0      ROS: A 10-system ROS was performed and is negative except for those items noted above and/or in the HPI.    PAST MEDICAL & SURGICAL HISTORY:  Cervical radiculopathy    Cervical herniated disc    Depression    History of recent dental procedure  Root canal on 1/12/21    H/O oral surgery    Status post anal fissurectomy  2019      FAMILY HISTORY:      SOCIAL HISTORY: SOCIAL HISTORY:     Marital Status: (  )   (  ) Single  (  )   (  )      Occupation:      Lives: (  ) alone  (  ) with children   (  ) with spouse  (  ) with parents  (  ) other     Illicit Drug Use: (  ) never used  (  ) other _____     Tobacco Use:  (  ) never smoked  (  ) former smoker  (  ) current smoker  (  ) pack year  (  ) last cigarette date     Alcohol Use:      Sexual History:        MEDICATIONS  Home Medications:  acetaminophen 325 mg oral tablet: 3 tab(s) orally every 6 hours (29 Jan 2021 10:07)      MEDICATIONS  (STANDING):  meclizine 50 milliGRAM(s) Oral Once    MEDICATIONS  (PRN):      ALLERGIES/INTOLERANCES:  Allergies  No Known Allergies    Intolerances      OBJECTIVE:  VITALS   Vital Signs Last 24 Hrs  T(C): 36.8 (11 Jan 2022 16:02), Max: 36.8 (11 Jan 2022 16:02)  T(F): 98.3 (11 Jan 2022 16:02), Max: 98.3 (11 Jan 2022 16:02)  HR: 72 (11 Jan 2022 16:02) (72 - 72)  BP: 127/87 (11 Jan 2022 16:02) (127/87 - 127/87)  BP(mean): --  RR: 18 (11 Jan 2022 16:02) (18 - 18)  SpO2: 100% (11 Jan 2022 16:02) (100% - 100%)    PHYSICAL EXAM:  Neurological Exam:  MS: Awake, alert, oriented to person, place, situation, time. Normal affect. Follows all commands.    Language: Speech is clear, fluent with good repetition & comprehension (able to name objects_watch__)    CNs: PERRLA (R = 3mm, L = 3mm). VFF. EOMI no nystagmus, no diplopia. V1-3 intact to LT/pinprick, well developed masseter muscles b/l. No facial asymmetry b/l, Hearing grossly normal (rubbing fingers) b/l. Symmetric palate elevation in midline. Gag reflex deferred. Head turning & shoulder shrug intact b/l. Tongue midline, normal movements, no atrophy.    HINTS test negative, abhi divine pike negative    Motor: Normal muscle bulk & tone. No noticeable tremor or seizure. No pronator drift.              Deltoid	Biceps	Triceps 	   R	5	5	5	5	 	  L	5	5	5	5			    	H-Flex	H-Ext	K-Flex	K-Ext	D-Flex	P-Flex  R	5	5	5	5	5	5		   L	5	5	5	5	5	5		     Sensation: Intact to LT/PP/Temp/Vibration/Position b/l throughout.     Reflexes:              Biceps(C5)       BR(C6)     Triceps(C7)               Patellar(L4)    Achilles(S1)    Plantar Resp  R	3	          3             3		        3		    3		Down   L	3	          3             3		        3		    3		Down     Coordination: intact rapid-alt movements. No dysmetria to FTN/HTS    Gait:  Normal Romberg. No postural instability. Normal stance and tandem gait.     LABORATORY:  CBC                       12.2   6.08  )-----------( 325      ( 11 Jan 2022 16:28 )             37.0     Chem       LFTs   Coagulopathy   Lipid Panel   A1c   Cardiac enzymes     U/A   CSF  Immunological  Other    STUDIES & IMAGING:  Studies (EKG, EEG, EMG, etc):     Radiology (XR, CT, MR, U/S, TTE/INGRID):  Unremarkable noncontrast brain CT. Normal CT perfusion.   Normal CTA of the head and neck. No large vessel occlusion.   HPI: A 41 year old female with PMH of C5-C6 and C6-C7 total disc replacement 2/2 cervical radiculopathy has presented to the ED with room spinning dizziness since 7 AM on 1/11/22 (LKW). Dizziness gets worse when she sits up. Associated with nausea and hot sweats. Vomited twice. Pt admits to pressure and cracking in both of her ear, unknown when it started. Pt denies any headache, nausea,  numbness, tingling, weakness, changes in vision, ear or jaw pain.  Pt found to have a UTI 3 days, currently on cephalexin. Had COVID 2 weeks ago not vaccinated. Pt is not a TPA candidate as she's outside window. Pt is not a thrombectomy candidate as no LVO seen on imaging.     NIHSS 0  preMRS 0      ROS: A 10-system ROS was performed and is negative except for those items noted above and/or in the HPI.    PAST MEDICAL & SURGICAL HISTORY:  Cervical radiculopathy    Cervical herniated disc    Depression    History of recent dental procedure  Root canal on 1/12/21    H/O oral surgery    Status post anal fissurectomy  2019      FAMILY HISTORY:      SOCIAL HISTORY: SOCIAL HISTORY:     Marital Status: (  )   (  ) Single  (  )   (  )      Occupation:      Lives: (  ) alone  (  ) with children   (  ) with spouse  (  ) with parents  (  ) other     Illicit Drug Use: (  ) never used  (  ) other _____     Tobacco Use:  (  ) never smoked  (  ) former smoker  (  ) current smoker  (  ) pack year  (  ) last cigarette date     Alcohol Use:      Sexual History:        MEDICATIONS  Home Medications:  acetaminophen 325 mg oral tablet: 3 tab(s) orally every 6 hours (29 Jan 2021 10:07)      MEDICATIONS  (STANDING):  meclizine 50 milliGRAM(s) Oral Once    MEDICATIONS  (PRN):      ALLERGIES/INTOLERANCES:  Allergies  No Known Allergies    Intolerances      OBJECTIVE:  VITALS   Vital Signs Last 24 Hrs  T(C): 36.8 (11 Jan 2022 16:02), Max: 36.8 (11 Jan 2022 16:02)  T(F): 98.3 (11 Jan 2022 16:02), Max: 98.3 (11 Jan 2022 16:02)  HR: 72 (11 Jan 2022 16:02) (72 - 72)  BP: 127/87 (11 Jan 2022 16:02) (127/87 - 127/87)  BP(mean): --  RR: 18 (11 Jan 2022 16:02) (18 - 18)  SpO2: 100% (11 Jan 2022 16:02) (100% - 100%)    PHYSICAL EXAM:  Neurological Exam:  MS: Awake, alert, oriented to person, place, situation, time. Normal affect. Follows all commands.    Language: Speech is clear, fluent with good repetition & comprehension (able to name objects_watch__)    CNs: PERRLA (R = 3mm, L = 3mm). VFF. EOMI no nystagmus, no diplopia. V1-3 intact to LT/pinprick, well developed masseter muscles b/l. No facial asymmetry b/l, Hearing grossly normal (rubbing fingers) b/l. Symmetric palate elevation in midline. Gag reflex deferred. Head turning & shoulder shrug intact b/l. Tongue midline, normal movements, no atrophy.    HINTS test negative, abhi divine pike negative    Motor: Normal muscle bulk & tone. No noticeable tremor or seizure. No pronator drift.              Deltoid	Biceps	Triceps 	   R	5	5	5	5	 	  L	5	5	5	5			    	H-Flex	H-Ext	K-Flex	K-Ext	D-Flex	P-Flex  R	5	5	5	5	5	5		   L	5	5	5	5	5	5		     Sensation: Intact to LT/PP/Temp/Vibration/Position b/l throughout.     Reflexes:              Biceps(C5)       BR(C6)     Triceps(C7)               Patellar(L4)    Achilles(S1)    Plantar Resp  R	3	          3             3		        3		    3		Down   L	3	          3             3		        3		    3		Down     Coordination: intact rapid-alt movements. No dysmetria to FTN/HTS    Gait:  Normal Romberg. No postural instability. Normal stance and tandem gait.     LABORATORY:  CBC                       12.2   6.08  )-----------( 325      ( 11 Jan 2022 16:28 )             37.0     Chem       LFTs   Coagulopathy   Lipid Panel   A1c   Cardiac enzymes     U/A   CSF  Immunological  Other    STUDIES & IMAGING:  Studies (EKG, EEG, EMG, etc):     Radiology (XR, CT, MR, U/S, TTE/INGRID):  Unremarkable noncontrast brain CT. Normal CT perfusion.   Normal CTA of the head and neck. No large vessel occlusion.

## 2022-01-11 NOTE — ED ADULT NURSE NOTE - OBJECTIVE STATEMENT
41 y.o. female coming in from home via private car for dizziness. pt states that she had an episode of dizziness when she woke up at 0700 today. her  brought her in at 1600 because it was not getting better. a code stroke was called at 1605, CT was negative according to neuro MD Soriano and no TPA was given. pt was still dizzy when moving from the wheelchair to the CT table and was noted to have an unsteady gait that pt states is not her baseline. PMH of covid positive 2 weeks ago and a UTI that is still being treated with antibiotics. pt denies any other PMH. A&Ox3, vitals stable, lung sounds clear bilaterally, no abdominal tenderness, neuro exam unremarkable (PERRL 4, BUTLER, bilateral sensations noted bilaterally, follow commands), denies chest pain/weakness/SOB, no other complaints at this time. bed in lowest position.

## 2022-01-11 NOTE — ED PROVIDER NOTE - NSICDXPASTMEDICALHX_GEN_ALL_CORE_FT
PAST MEDICAL HISTORY:  Cervical herniated disc     Cervical radiculopathy     Depression     History of recent dental procedure Root canal on 1/12/21

## 2022-01-11 NOTE — ED CDU PROVIDER DISPOSITION NOTE - PATIENT PORTAL LINK FT
You can access the FollowMyHealth Patient Portal offered by [facility] by registering at the following website: [email or web address]. By joining [employer]’s FollowMyHealth portal, you will also be able to view your health information using other applications (apps) compatible with our system.

## 2022-01-12 VITALS
TEMPERATURE: 98 F | HEART RATE: 69 BPM | RESPIRATION RATE: 18 BRPM | OXYGEN SATURATION: 99 % | DIASTOLIC BLOOD PRESSURE: 63 MMHG | SYSTOLIC BLOOD PRESSURE: 104 MMHG

## 2022-01-12 LAB
A1C WITH ESTIMATED AVERAGE GLUCOSE RESULT: 5.5 % — SIGNIFICANT CHANGE UP (ref 4–5.6)
CHOLEST SERPL-MCNC: 212 MG/DL — HIGH
ESTIMATED AVERAGE GLUCOSE: 111 MG/DL — SIGNIFICANT CHANGE UP (ref 68–114)
HDLC SERPL-MCNC: 73 MG/DL — SIGNIFICANT CHANGE UP
LIPID PNL WITH DIRECT LDL SERPL: 128 MG/DL — HIGH
NON HDL CHOLESTEROL: 139 MG/DL — HIGH
TRIGL SERPL-MCNC: 55 MG/DL — SIGNIFICANT CHANGE UP

## 2022-01-12 PROCEDURE — 82962 GLUCOSE BLOOD TEST: CPT

## 2022-01-12 PROCEDURE — 93005 ELECTROCARDIOGRAM TRACING: CPT

## 2022-01-12 PROCEDURE — 70551 MRI BRAIN STEM W/O DYE: CPT | Mod: 26,MA

## 2022-01-12 PROCEDURE — 82435 ASSAY OF BLOOD CHLORIDE: CPT

## 2022-01-12 PROCEDURE — 82803 BLOOD GASES ANY COMBINATION: CPT

## 2022-01-12 PROCEDURE — 84702 CHORIONIC GONADOTROPIN TEST: CPT

## 2022-01-12 PROCEDURE — 85018 HEMOGLOBIN: CPT

## 2022-01-12 PROCEDURE — 80061 LIPID PANEL: CPT

## 2022-01-12 PROCEDURE — 82330 ASSAY OF CALCIUM: CPT

## 2022-01-12 PROCEDURE — 83605 ASSAY OF LACTIC ACID: CPT

## 2022-01-12 PROCEDURE — U0003: CPT

## 2022-01-12 PROCEDURE — 82947 ASSAY GLUCOSE BLOOD QUANT: CPT

## 2022-01-12 PROCEDURE — 85014 HEMATOCRIT: CPT

## 2022-01-12 PROCEDURE — 70450 CT HEAD/BRAIN W/O DYE: CPT | Mod: MA

## 2022-01-12 PROCEDURE — 84132 ASSAY OF SERUM POTASSIUM: CPT

## 2022-01-12 PROCEDURE — 96375 TX/PRO/DX INJ NEW DRUG ADDON: CPT | Mod: XU

## 2022-01-12 PROCEDURE — 96374 THER/PROPH/DIAG INJ IV PUSH: CPT | Mod: XU

## 2022-01-12 PROCEDURE — 70551 MRI BRAIN STEM W/O DYE: CPT | Mod: MA

## 2022-01-12 PROCEDURE — 99285 EMERGENCY DEPT VISIT HI MDM: CPT | Mod: 25

## 2022-01-12 PROCEDURE — 85610 PROTHROMBIN TIME: CPT

## 2022-01-12 PROCEDURE — 70498 CT ANGIOGRAPHY NECK: CPT | Mod: MA

## 2022-01-12 PROCEDURE — 96376 TX/PRO/DX INJ SAME DRUG ADON: CPT | Mod: XU

## 2022-01-12 PROCEDURE — 83036 HEMOGLOBIN GLYCOSYLATED A1C: CPT

## 2022-01-12 PROCEDURE — 36415 COLL VENOUS BLD VENIPUNCTURE: CPT

## 2022-01-12 PROCEDURE — 80053 COMPREHEN METABOLIC PANEL: CPT

## 2022-01-12 PROCEDURE — G0378: CPT

## 2022-01-12 PROCEDURE — 84295 ASSAY OF SERUM SODIUM: CPT

## 2022-01-12 PROCEDURE — 85730 THROMBOPLASTIN TIME PARTIAL: CPT

## 2022-01-12 PROCEDURE — 70496 CT ANGIOGRAPHY HEAD: CPT | Mod: MA

## 2022-01-12 PROCEDURE — 0042T: CPT

## 2022-01-12 PROCEDURE — 85025 COMPLETE CBC W/AUTO DIFF WBC: CPT

## 2022-01-12 PROCEDURE — 84484 ASSAY OF TROPONIN QUANT: CPT

## 2022-01-12 PROCEDURE — 99217: CPT

## 2022-01-12 RX ORDER — DIAZEPAM 5 MG
5 TABLET ORAL ONCE
Refills: 0 | Status: DISCONTINUED | OUTPATIENT
Start: 2022-01-12 | End: 2022-01-12

## 2022-01-12 RX ORDER — ONDANSETRON 8 MG/1
4 TABLET, FILM COATED ORAL ONCE
Refills: 0 | Status: COMPLETED | OUTPATIENT
Start: 2022-01-12 | End: 2022-01-12

## 2022-01-12 RX ORDER — SODIUM CHLORIDE 9 MG/ML
1000 INJECTION INTRAMUSCULAR; INTRAVENOUS; SUBCUTANEOUS ONCE
Refills: 0 | Status: COMPLETED | OUTPATIENT
Start: 2022-01-12 | End: 2022-01-12

## 2022-01-12 RX ORDER — MECLIZINE HCL 12.5 MG
1 TABLET ORAL
Qty: 12 | Refills: 0
Start: 2022-01-12 | End: 2022-01-15

## 2022-01-12 RX ORDER — ONDANSETRON 8 MG/1
1 TABLET, FILM COATED ORAL
Qty: 9 | Refills: 0
Start: 2022-01-12 | End: 2022-01-14

## 2022-01-12 RX ORDER — MECLIZINE HCL 12.5 MG
50 TABLET ORAL ONCE
Refills: 0 | Status: COMPLETED | OUTPATIENT
Start: 2022-01-12 | End: 2022-01-12

## 2022-01-12 RX ADMIN — SODIUM CHLORIDE 1000 MILLILITER(S): 9 INJECTION INTRAMUSCULAR; INTRAVENOUS; SUBCUTANEOUS at 10:16

## 2022-01-12 RX ADMIN — ONDANSETRON 4 MILLIGRAM(S): 8 TABLET, FILM COATED ORAL at 12:47

## 2022-01-12 RX ADMIN — Medication 50 MILLIGRAM(S): at 06:17

## 2022-01-12 RX ADMIN — Medication 5 MILLIGRAM(S): at 08:44

## 2022-01-12 RX ADMIN — ONDANSETRON 4 MILLIGRAM(S): 8 TABLET, FILM COATED ORAL at 08:44

## 2022-01-12 NOTE — ED ADULT NURSE REASSESSMENT NOTE - COMFORT CARE
ambulated to bathroom/darkened lights/plan of care explained/po fluids offered/repositioned/side rails up/warm blanket provided
darkened lights

## 2022-01-12 NOTE — ED ADULT NURSE REASSESSMENT NOTE - NS ED NURSE REASSESS COMMENT FT1
07.00 Am Received the Pt from  THERESA Queen  Pt is Observed for Dizziness Vertigo for MRI. Received the Pt A&OX 4 obeys commands Mary N/V/D fever chills cp SOB   Comfort care & safety measures continued  IV site looks clean & dry no signs of infiltration noted pt denies  pain IV site .  Pt is advised to call for help  call bell with in the reach pt verbalized the understanding .  pending CDU  MD meier . GCS 15/15 A&OX 4 PERRLA  size 3 Strong upper & lower extremities Pt still has vertigo spells with  movement & change of position     No facial droop  No Hand Leg drop denies numbness tingling Continue to monitor
12.30 Pt is evaluated by CDU MD Savita mendiola . pt is feeling better.  Pt is discharged . Ml out  ANDREY Keith  explained the follow up care & gave the discharge summary  . Pt has stable vitals steady gait  A&OX 4 at the time of Discharge
Report received from THERESA Barnes. Patient on stretcher in [facility] 7. A&O x4. VSS. Patient ambulatory in ED with steady gait. IV patent and flushes without difficultly. Patient denies any pain. Stretcher in lowest position, side rails up. Patient instructed to call RN if assistance is needed. Pending CDU consult.
Pt. received from ED THERESA Wiggins. Pt. A&Ox4 resting in bed comfortably. Pt. respirations even and unlabored. Pt. skin warm dry intact appropriate for ethnicity. Pt. ambulates with observed steady gait. Pt. s/p stroke code in ED. Pt. states she has had multiple dizzy/vertigo spells since 7am this morning. Pt. states dizzy/vertigo spells occur when moving. Pt. remains in CDU pending MRI Head in AM. Pt. neuro checks q4 initiated/maintained. Pt. has no neuro deficits noted. Strength equal in B/L UE/LE. Sensory intact. PERRLA present. MRI screening form filled out/faxed. Pt. has no complaints of pain or discomfort at this time. Denies nausea/vomiting/abdominal pain/chest pain/pressure/SOB. R A/C IV secured and patent. Pt. NSR on cardiac monitor. Vitals remain stable - view flowsheet. Repeat labs to be drawn in the AM. Medications to be administered as ordered. Call bell within reach encouraged to call for assistance when needed. Side rails remain up for pt. safety. Will continue to monitor and reassess.

## 2022-01-12 NOTE — ED CDU PROVIDER SUBSEQUENT DAY NOTE - HISTORY
CDU PROGRESS NOTE ANDREY YU: Pt resting comfortably, no interval change from prior exam. VSS. No events on telemetry. No focal neuro deficits. Will continue to monitor, MRI in am.

## 2022-01-12 NOTE — ED CDU PROVIDER SUBSEQUENT DAY NOTE - PROGRESS NOTE DETAILS
CDU NOTE ANDREY Keith: pt resting comfortably, feels well sitting still but felt dizzy walking to bathroom this morning. NAD recent VSS. no events on tele. pt going for MRI now.  will order Valium for her when she comes back pt con't c/o vertigo. no h/a or neck pain. some nausea.  sympt exacerbated by mvm. pt had neg CT head and CTA head and neck.  going to MRI at this time.  Neuro following pt.

## 2022-01-12 NOTE — ED CDU PROVIDER SUBSEQUENT DAY NOTE - CROS ED ENMT ALL NEG
Patient called, advised as per Dr. Chau's note, advised the path results were benign. Patient verb understanding and hung the phone up.    negative...

## 2022-01-14 ENCOUNTER — NON-APPOINTMENT (OUTPATIENT)
Age: 42
End: 2022-01-14

## 2022-01-24 ENCOUNTER — RESULT REVIEW (OUTPATIENT)
Age: 42
End: 2022-01-24

## 2022-03-22 NOTE — H&P PST ADULT - VISION (WITH CORRECTIVE LENSES IF THE PATIENT USUALLY WEARS THEM):
Encounter Date: 1/21/2022       History     Chief Complaint   Patient presents with    COVID-19 Concerns     Pt came in from [facility] and Covid + x 1 week. Pt reported not eating and drinking x 1week and N/V the patient also has not been taking her medication      71F with PMH myasthenia graves, malnutrition, CCY, CVA, DM, HTN, presents from NH with poor PO and nausea in setting of COVID x 1 week. Pt reports she hasn't been eating, drinking or taking her medication due to fatigue and poor appetite for several weeks. Pt also endorses cough since last week, found to be COVID pos. Symptoms constant.  She denies any shortness of breath, fever, chills, chest pain, dysuria, or urinary frequency.          Review of patient's allergies indicates:   Allergen Reactions    [place]     Apple     Avelox [moxifloxacin]     Barley     Coconut     Corn containing products Other (See Comments)     Nausea and hives    Diphtheria-tetanus-pertuss vac     Doxycycline     Fish containing products     Green bean     Hazelnut     Honey     Insulins     Milk containing products     Nutmeg     Pcn [penicillins]     Pistachio nut     Shellfish containing products     Succinylcholine     Wheat containing prod      Past Medical History:   Diagnosis Date    CVA (cerebral infarction)     Diabetes mellitus     HLD (hyperlipidemia)     Hypertension     Myasthenia gravis     Severe malnutrition 12/13/2021    Nutrition Problem: Severe Protein-Calorie Malnutrition Malnutrition in the context of Chronic Illness/Injury  Related to (etiology): Inability to consume sufficient energy Food- and nutrition-related knowledge deficit  Signs and Symptoms (as evidenced by): Energy Intake: <75% of estimated energy requirement for 2 years Body Fat Depletion: moderate and severe depletion of orbitals, triceps and thor     Past Surgical History:   Procedure Laterality Date    bladder lift      BONE MARROW BIOPSY      CHOLECYSTECTOMY       HYSTERECTOMY      PITUITARY SURGERY       Family History   Problem Relation Age of Onset    Arthritis Mother     Cancer Mother     Hearing loss Mother     Heart disease Mother     Hyperlipidemia Mother     Hypertension Mother     Stroke Mother     Alcohol abuse Father     Cancer Father     Diabetes Father     Drug abuse Father     Kidney disease Sister      Social History     Tobacco Use    Smoking status: Never Smoker    Smokeless tobacco: Never Used   Substance Use Topics    Alcohol use: No     Review of Systems   Constitutional: Positive for appetite change and fatigue. Negative for chills and fever.   Eyes:        Neg vision changes   Respiratory: Positive for cough. Negative for shortness of breath.    Cardiovascular: Negative for chest pain and leg swelling.   Gastrointestinal: Positive for nausea. Negative for abdominal pain and vomiting.        Neg changes in stool   Genitourinary: Negative for decreased urine volume, flank pain and urgency.        Neg changes in urination   Musculoskeletal: Negative for arthralgias, joint swelling and myalgias.   Skin: Negative for rash.   Neurological: Positive for weakness. Negative for dizziness, numbness and headaches.   Hematological: Does not bruise/bleed easily.       Physical Exam     Initial Vitals [01/21/22 1202]   BP Pulse Resp Temp SpO2   130/80 89 18 98.3 °F (36.8 °C) 97 %      MAP       --         Physical Exam    Nursing note and vitals reviewed.  Constitutional: She is not diaphoretic. No distress.   Cachectic, chronically ill appearing   HENT:   Head: Normocephalic and atraumatic.   Nose: Nose normal.   Eyes: Conjunctivae and EOM are normal. Pupils are equal, round, and reactive to light.   Neck: Neck supple.   Normal range of motion.  Cardiovascular: Normal rate and regular rhythm.   Pulmonary/Chest: Breath sounds normal. No respiratory distress. She has no wheezes. She has no rhonchi. She has no rales. She exhibits no tenderness.    Abdominal: Abdomen is soft. Bowel sounds are normal. She exhibits no distension. There is no abdominal tenderness.   Musculoskeletal:         General: No tenderness or edema. Normal range of motion.      Cervical back: Normal range of motion and neck supple.     Neurological: She is alert and oriented to person, place, and time.   Globally weakened   Skin: Skin is warm and dry. Capillary refill takes less than 2 seconds. No rash noted.   Psychiatric: She has a normal mood and affect. Her behavior is normal. Judgment and thought content normal.         ED Course   Procedures  Labs Reviewed   CBC W/ AUTO DIFFERENTIAL - Abnormal; Notable for the following components:       Result Value    WBC 3.38 (*)     All other components within normal limits   COMPREHENSIVE METABOLIC PANEL - Abnormal; Notable for the following components:    Sodium 135 (*)     Potassium 2.8 (*)     Glucose 166 (*)     Calcium 8.1 (*)     Total Protein 5.6 (*)     Albumin 2.7 (*)     All other components within normal limits   URINALYSIS, REFLEX TO URINE CULTURE - Abnormal; Notable for the following components:    Appearance, UA Cloudy (*)     Protein, UA 1+ (*)     Glucose, UA 3+ (*)     Ketones, UA Trace (*)     All other components within normal limits    Narrative:     Specimen Source->Urine   URINALYSIS MICROSCOPIC - Abnormal; Notable for the following components:    RBC, UA 15 (*)     WBC, UA 25 (*)     WBC Clumps, UA Occasional (*)     Bacteria Few (*)     Yeast, UA Few (*)     Ca Oxalate Fani, UA Many (*)     All other components within normal limits    Narrative:     Specimen Source->Urine   SARS-COV-2 RDRP GENE - Abnormal; Notable for the following components:    POC Rapid COVID Positive (*)     All other components within normal limits    Narrative:     This test utilizes isothermal nucleic acid amplification   technology to detect the SARS-CoV-2 RdRp nucleic acid segment.   The analytical sensitivity (limit of detection) is 125 genome    equivalents/mL.   A POSITIVE result implies infection with the SARS-CoV-2 virus;   the patient is presumed to be contagious.     A NEGATIVE result means that SARS-CoV-2 nucleic acids are not   present above the limit of detection. A NEGATIVE result should be   treated as presumptive. It does not rule out the possibility of   COVID-19 and should not be the sole basis for treatment decisions.   If COVID-19 is strongly suspected based on clinical and exposure   history, re-testing using an alternate molecular assay should be   considered.   This test is only for use under the Food and Drug   Administration s Emergency Use Authorization (EUA).   Commercial kits are provided by [facility].   Performance characteristics of the EUA have been independently   verified by [facility] Department of   Pathology and Laboratory Medicine.   _________________________________________________________________   The authorized Fact Sheet for Healthcare Providers and the authorized Fact   Sheet for Patients of the ID NOW COVID-19 are available on the FDA   website:     https://www.fda.gov/media/088371/download  https://www.fda.gov/media/403512/download         POCT GLUCOSE - Abnormal; Notable for the following components:    POCT Glucose 134 (*)     All other components within normal limits   LIPASE   LACTIC ACID, PLASMA   MAGNESIUM   SEDIMENTATION RATE   LACTATE DEHYDROGENASE   TROPONIN I   CK   PROTIME-INR   APTT        ECG Results          EKG 12-lead (In process)  Result time 01/21/22 15:35:51    In process by Interface, Lab In [facility] (01/21/22 15:35:51)                 Narrative:    Test Reason : E87.6,    Vent. Rate : 077 BPM     Atrial Rate : 077 BPM     P-R Int : 170 ms          QRS Dur : 106 ms      QT Int : 318 ms       P-R-T Axes : 068 003 -65 degrees     QTc Int : 359 ms    Normal sinus rhythm  Cannot rule out Anterior infarct ,age undetermined  ST and T wave abnormality, consider inferior  ischemia  Abnormal ECG  When compared with ECG of 13-DEC-2021 17:14,  Inverted T waves have replaced nonspecific T wave abnormality in Inferior  leads  QT has shortened    Referred By: [email or web address]   SELF           Confirmed By:                             Imaging Results          X-Ray Chest AP Portable (Final result)  Result time 01/21/22 13:53:11    Final result by Clarissa Burrell MD (01/21/22 13:53:11)                 Impression:      Possible new infiltrate right lower lung zone.      Electronically signed by: Clarissa Burrell  Date:    01/21/2022  Time:    13:53             Narrative:    EXAMINATION:  XR CHEST AP PORTABLE    CLINICAL HISTORY:  Weakness    TECHNIQUE:  Single frontal view of the chest was performed.    COMPARISON:  04/15/2016    FINDINGS:  Again seen, loop recorder projected over the left medial chest.    The heart size is normal.  There is mild calcification of the aortic knob consistent with atherosclerotic stigmata.    There are new patchy airspace opacities at the right base.  Although portions are linear and may represent subsegmental atelectasis, some have a more ill-defined appearance and are therefore concerning for new infiltrate.                                 Medications   lactated ringers bolus 1,000 mL (0 mLs Intravenous Stopped 1/21/22 1657)   ondansetron injection 4 mg (4 mg Intravenous Given 1/21/22 1510)   potassium bicarbonate disintegrating tablet 40 mEq (40 mEq Oral Given 1/21/22 1520)   potassium chloride 10 mEq in 100 mL IVPB (0 mEq Intravenous Stopped 1/21/22 1645)   cefTRIAXone (ROCEPHIN) 2 g/50 mL D5W IVPB (0 g Intravenous Stopped 1/21/22 1847)   magnesium oxide tablet 400 mg (400 mg Oral Given 1/21/22 2029)   potassium bicarbonate disintegrating tablet 50 mEq (50 mEq Oral Given 1/21/22 2029)   potassium bicarbonate disintegrating tablet 50 mEq (50 mEq Oral Given 1/24/22 0939)     Medical Decision Making:   History:   Old Medical Records: I decided to obtain old medical  records.  Old Records Summarized: records from clinic visits and records from previous admission(s).  Initial Assessment:   Pt is frail and chronically ill appearing, but otherwise NAD  Differential Diagnosis:   Dehydration, malnutrition, viral syndrome, electrolyte or metabolic abnormality, UTI, PNA  Independently Interpreted Test(s):   I have ordered and independently interpreted X-rays - see summary below.       <> Summary of X-Ray Reading(s): RLL infiltrate  Clinical Tests:   Lab Tests: Ordered and Reviewed  Radiological Study: Ordered and Reviewed  Medical Tests: Reviewed and Ordered  ED Management:  Pt appears dehydrated, will give IVF and ondansetron.   Labs show hypokalemia, will replete.   UA mildly positive with 25 WBCs and few bacteria, will give ceftriaxone.   CXR showing new RLL consolidation, plan admission.              ED Course as of 03/22/22 0941   Fri Jan 21, 2022   1400 Lipase: 9 [JR]   1401 Lactate, Brian: 1.0 [JR]   1401 WBC(!): 3.38 [JR]   1401 Hemoglobin: 13.0 [JR]   1401 Glucose(!): 166 [JR]   1446 Potassium(!): 2.8 [JR]      ED Course User Index  [JR] Rochelle Natarajan MD             Clinical Impression:   Final diagnoses:  [R53.1] Weakness  [E87.6] Hypokalemia (Primary)  [J18.9] Pneumonia of right lower lobe due to infectious organism  [N39.0] Urinary tract infection without hematuria, site unspecified  [N39.0] UTI (urinary tract infection)  [J18.9] Pneumonia          ED Disposition Condition    Observation               Rochelle Natarajan MD  03/23/22 2200       Rochelle Natarajan MD  03/24/22 1631     Normal vision: sees adequately in most situations; can see medication labels, newsprint

## 2023-01-31 NOTE — ED ADULT NURSE REASSESSMENT NOTE - BEFAST SPEECH
Abdomen, soft, nontender, nondistended, normal bowel sounds, Liver and Spleen, no hepatomegaly present
Yes

## 2023-02-07 NOTE — PHYSICAL THERAPY INITIAL EVALUATION ADULT - MANUAL MUSCLE TESTING RESULTS, REHAB EVAL
Client called CM to report on the phone with social security admin and  and in need of assistance communicating issue with SSI benefits/identity theft  CM informed SSA worker of Client's communication difficulties associated with mild cognitive impairment, how this CM has been assisting Client report identity theft to local, state and federal agencies, incarceration records obtained and submitted to both SSA and DHS  SSA worker acknowledge documents received but have not been uploaded into the Client's records  [address] worker agreed to update Client's records this week, Client will then receive written notice about SSI case status  Client verbalized understanding  CM and Client discussed housing needs  Client agrees to visit Shelter ([address] [facility]) to talk to worker about supportive housing needs and report outcome  at least 3/5 t/o/grossly assessed due to

## 2023-04-27 NOTE — DISCHARGE NOTE NURSING/CASE MANAGEMENT/SOCIAL WORK - FLU SEASON?
Yes... Graft Cartilage Fenestration Text: The cartilage was fenestrated with a 2mm punch biopsy to help facilitate graft survival and healing.

## 2023-05-31 ENCOUNTER — OUTPATIENT (OUTPATIENT)
Dept: OUTPATIENT SERVICES | Facility: HOSPITAL | Age: 43
LOS: 1 days | End: 2023-05-31
Payer: COMMERCIAL

## 2023-05-31 ENCOUNTER — APPOINTMENT (OUTPATIENT)
Dept: MAMMOGRAPHY | Facility: IMAGING CENTER | Age: 43
End: 2023-05-31
Payer: COMMERCIAL

## 2023-05-31 ENCOUNTER — APPOINTMENT (OUTPATIENT)
Dept: ULTRASOUND IMAGING | Facility: IMAGING CENTER | Age: 43
End: 2023-05-31
Payer: COMMERCIAL

## 2023-05-31 DIAGNOSIS — Z98.890 OTHER SPECIFIED POSTPROCEDURAL STATES: Chronic | ICD-10-CM

## 2023-05-31 DIAGNOSIS — Z00.8 ENCOUNTER FOR OTHER GENERAL EXAMINATION: ICD-10-CM

## 2023-05-31 PROCEDURE — 77067 SCR MAMMO BI INCL CAD: CPT

## 2023-05-31 PROCEDURE — 76641 ULTRASOUND BREAST COMPLETE: CPT

## 2023-05-31 PROCEDURE — 76641 ULTRASOUND BREAST COMPLETE: CPT | Mod: 26,50

## 2023-05-31 PROCEDURE — 77063 BREAST TOMOSYNTHESIS BI: CPT | Mod: 26

## 2023-05-31 PROCEDURE — 77063 BREAST TOMOSYNTHESIS BI: CPT

## 2023-05-31 PROCEDURE — 77067 SCR MAMMO BI INCL CAD: CPT | Mod: 26

## 2023-09-12 NOTE — PATIENT PROFILE OB - AS DELIV VAG OTHER PROCEDURES
History  Chief Complaint   Patient presents with   • Breast Problem     Pt presents with L breast pain and leakage from nipple, stage 1 breast CA , lump ectomy and radiation, previous cellulitis in same breast      57 yo female with history of left breast CA followed by Dr. Luis Rubio (left breast lumpectomy with sharyn  localization and left axillary sentinel lymph node biopsy, drain placement for pT1bN0 ER/MD+ HER2- grade 1 invasive ductal carcinoma of the left breast with grade 2 DCIS, negative margins, negative nodes, on 4/10/23) underwent XRT for 3 weeks wheich she completed on 6/22/23; no chemo;  patient contacted the office on 9/6/23 with concerns of left breast cellulitis and completed a course of cephalexin. She presents today with increasing left breast pain and bloody yellowish discharge from the nipple. She denies any history of fever or chills. She does not feel the redness is improved. Her blood sugars are doing well she denies shortness of breath or chest pain she had no nausea vomiting no abdominal pain no other rashes no lower extremity edema no history of any trauma. tdap 7/7/2020  PMHX: Type 2 diabetes mellitus on insulin pump, hyperlipidemia coronary artery disease hypertension breast cancer migraines hydrocephalus past surgical history breast lumpectomy with partial mastectomy hysterectomy neck surgery          Prior to Admission Medications   Prescriptions Last Dose Informant Patient Reported? Taking?    FLUoxetine (PROzac) 40 MG capsule  Self No No   Sig: Take 1 capsule (40 mg total) by mouth daily   Fiasp 100 UNIT/ML SOLN   Yes No   Sig: INJECT 120 UNITS ONCE DAILY VIA INSULIN PUMP   anastrozole (ARIMIDEX) 1 mg tablet   No No   Sig: Take 1 tablet (1 mg total) by mouth daily   atorvastatin (LIPITOR) 40 mg tablet  Self Yes No   Sig: Take 40 mg by mouth every evening   cephalexin (KEFLEX) 500 mg capsule   No No   Sig: Take 1 capsule (500 mg total) by mouth every 6 (six) hours for 7 days gabapentin (Neurontin) 600 MG tablet   No No   Sig: Take 1 tablet (600 mg total) by mouth 3 (three) times a day   lisdexamfetamine (VYVANSE) 30 MG capsule   No No   Sig: Take 1 capsule (30 mg total) by mouth every morning Max Daily Amount: 30 mg   losartan (COZAAR) 100 MG tablet  Self Yes No   Sig: Take 100 mg by mouth daily   methocarbamol (ROBAXIN) 500 mg tablet   No No   Sig: Take 1 tablet (500 mg total) by mouth 3 (three) times a day   Patient not taking: Reported on 8/7/2023   metoprolol succinate (TOPROL-XL) 100 mg 24 hr tablet  Self Yes No   Sig: Take 100 mg by mouth daily   semaglutide, 0.25 or 0.5 mg/dose, (Ozempic, 0.25 or 0.5 MG/DOSE,) 2 mg/3 mL injection pen   No No   Sig: Inject 0.75 mL (0.5 mg total) under the skin every 7 days      Facility-Administered Medications: None       Past Medical History:   Diagnosis Date   • Anemia     iron deficiency anemia   • Anxiety    • Breast cancer (HCC)    • Depression    • Diabetes mellitus ([address])    • Fibroid     [facility]   today 12/8/2021   • History of transfusion    • Hydrocephalus ([address])    • Hyperlipidemia    • Hypertension    • Migraine    • Myocardial infarction ([address]) 01/19/2022   • PONV (postoperative nausea and vomiting)    • Sciatica    • Seasonal allergies    • Vertigo    • Wears glasses        Past Surgical History:   Procedure Laterality Date   • BACK SURGERY      C1-3 fusion   • COLONOSCOPY     • EPIDURAL BLOCK INJECTION Right 07/02/2020    Procedure: Right L4-5 and L5-S1 transforaminal epidural steroid injection ([id number]);   Surgeon: Rema Warren MD;  Location: [facility];  Service: Pain Management    • EPIDURAL BLOCK INJECTION N/A 09/02/2021    Procedure: BLOCK / INJECTION EPIDURAL STEROID LUMBAR  L5-S1 IESI;  Surgeon: Rema Warren MD;  Location: [facility];  Service: Pain Management    • FL GUIDED NEEDLE PLAC BX/ASP/INJ  07/02/2020   • FL GUIDED NEEDLE PLAC BX/ASP/INJ  09/02/2021   • HYSTERECTOMY     • ND ESOPHAGOGASTRODUODENOSCOPY TRANSORAL DIAGNOSTIC N/A 10/23/2018    Procedure: EGD with Savary dilation AND COLONOSCOPY;  Surgeon: Kaity Banks MD;  Location: [facility];  Service: Gastroenterology   • NM LAPAROSCOPY W TOTAL HYSTERECTOMY UTERUS 250 GM/< N/A 12/08/2021    Procedure: Sheilda Kaylee; BILAT SALPINGECTOMY; EXCISION PERITONEAL CYST;  Surgeon: Bobby Alonso MD;  Location: [facility];  Service: Gynecology   • NM MASTECTOMY PARTIAL Left 4/10/2023    Procedure: LUMPECTOMY BREAST WITH BEAU  LOCALIZATION,  BIOPSY LYMPH NODE SENTINEL (INJECT AT 1130 BY DR Denise Wayne IN THE OR); Surgeon: Alonzo Hull MD;  Location: [facility];  Service: General   • TUBAL LIGATION     • US GUIDED BREAST BIOPSY LEFT COMPLETE Left 03/07/2023       Family History   Problem Relation Age of Onset   • Varicose Veins Mother    • Drug abuse Mother    • Diabetes Father    • Liver cancer Father         bile duct   • Cancer Father    • Breast cancer Sister         unknown age   • No Known Problems Daughter    • No Known Problems Daughter    • No Known Problems Maternal Aunt    • No Known Problems Maternal Aunt    • No Known Problems Maternal Aunt    • No Known Problems Maternal Aunt    • No Known Problems Maternal Aunt    • No Known Problems Paternal Aunt    • No Known Problems Maternal Grandmother    • No Known Problems Maternal Grandfather    • No Known Problems Paternal Grandmother    • No Known Problems Paternal Grandfather      I have reviewed and agree with the history as documented.     E-Cigarette/Vaping   • E-Cigarette Use Never User    • Cartridges/Day 0    • Comments none      E-Cigarette/Vaping Substances   • Nicotine No    • THC No    • CBD No    • Flavoring No    • Other No    • Unknown No      Social History     Tobacco Use   • Smoking status: Never   • Smokeless tobacco: Never   • Tobacco comments:     none   Vaping Use   • Vaping Use: Never used   Substance Use Topics   • Alcohol use: Not Currently     Comment: none   • Drug use: No     Comment: none Review of Systems   Constitutional: Negative for activity change, appetite change, chills and fever. HENT: Negative for congestion, ear pain, rhinorrhea, sneezing and sore throat. Eyes: Negative for discharge. Respiratory: Negative for cough and shortness of breath. Cardiovascular: Negative for chest pain and leg swelling. Gastrointestinal: Negative for abdominal pain, blood in stool, diarrhea, nausea and vomiting. Endocrine: Negative for polyuria. Genitourinary: Negative for dysuria, frequency and urgency. Musculoskeletal: Negative for back pain and myalgias. Skin: Negative for rash. Neurological: Negative for dizziness, weakness, light-headedness, numbness and headaches. Hematological: Negative for adenopathy. Psychiatric/Behavioral: Negative for confusion and suicidal ideas. All other systems reviewed and are negative. Physical Exam  Physical Exam  Vitals and nursing note reviewed. Constitutional:       General: She is not in acute distress. Appearance: She is not ill-appearing, toxic-appearing or diaphoretic. HENT:      Head: Normocephalic. Right Ear: Tympanic membrane and external ear normal.      Left Ear: Tympanic membrane and external ear normal.      Nose: Nose normal. No congestion or rhinorrhea. Mouth/Throat:      Mouth: Mucous membranes are moist.      Pharynx: No oropharyngeal exudate or posterior oropharyngeal erythema. Eyes:      General:         Right eye: No discharge. Left eye: No discharge. Extraocular Movements: Extraocular movements intact. Conjunctiva/sclera: Conjunctivae normal.      Pupils: Pupils are equal, round, and reactive to light. Cardiovascular:      Rate and Rhythm: Normal rate and regular rhythm. Pulmonary:      Effort: Pulmonary effort is normal. No respiratory distress. Breath sounds: Normal breath sounds. No wheezing or rales.       Comments: sats 95% on RA; see media tab for images left breast significantly larger than right yellowish scant nipple discharge. No left axilllary LAD; medial aspect of breast indurated. Palpable fliud wave/fluccuance of left breast  Abdominal:      General: Bowel sounds are normal. There is no distension. Tenderness: There is no abdominal tenderness. There is no right CVA tenderness, left CVA tenderness or guarding. Musculoskeletal:         General: Normal range of motion. Cervical back: Normal range of motion and neck supple. Right lower leg: No edema. Left lower leg: No edema. Skin:     General: Skin is warm and dry. Capillary Refill: Capillary refill takes less than 2 seconds. Neurological:      Mental Status: She is alert and oriented to person, place, and time. Mental status is at baseline. Cranial Nerves: No cranial nerve deficit. Sensory: No sensory deficit. Motor: No weakness.       Coordination: Coordination normal.   Psychiatric:         Mood and Affect: Mood normal.         Vital Signs  ED Triage Vitals [09/11/23 2042]   Temperature Pulse Respirations Blood Pressure SpO2   (!) 97.2 °F (36.2 °C) 98 21 139/81 95 %      Temp Source Heart Rate Source Patient Position - Orthostatic VS BP Location FiO2 (%)   Temporal Monitor Lying Left arm --      Pain Score       7           Vitals:    09/11/23 2042 09/12/23 0046   BP: 139/81 148/67   Pulse: 98 83   Patient Position - Orthostatic VS: Lying Lying         Visual Acuity      ED Medications  Medications   iohexol (OMNIPAQUE) 350 MG/ML injection (SINGLE-DOSE) 100 mL (100 mL Intravenous Given 9/11/23 7997)   sulfamethoxazole-trimethoprim (BACTRIM DS) 800-160 mg per tablet 1 tablet (1 tablet Oral Given 9/12/23 0044)       Diagnostic Studies  Results Reviewed     Procedure Component Value Units Date/Time    Wound culture and Gram stain [[id number]] Collected: 09/11/23 2158    Lab Status: Preliminary result Specimen: Wound from Breast, Left Updated: 09/12/23 1133     Gram Stain Result No Polys or Bacteria seen    Blood culture #1 [[id number]] Collected: 09/11/23 2158    Lab Status: Preliminary result Specimen: Blood from Arm, Right Updated: 09/12/23 0901     Blood Culture Received in Microbiology Lab. Culture in Progress. Blood culture #2 [[id number]] Collected: 09/11/23 2158    Lab Status: Preliminary result Specimen: Blood from Arm, Left Updated: 09/12/23 0901     Blood Culture Received in Microbiology Lab. Culture in Progress.     Basic metabolic panel [[id number]]  (Abnormal) Collected: 09/11/23 2158    Lab Status: Final result Specimen: Blood from Arm, Left Updated: 09/11/23 2224     Sodium 136 mmol/L      Potassium 3.7 mmol/L      Chloride 99 mmol/L      CO2 28 mmol/L      ANION GAP 9 mmol/L      BUN 20 mg/dL      Creatinine 0.93 mg/dL      Glucose 218 mg/dL      Calcium 9.5 mg/dL      eGFR 69 ml/min/1.73sq m     Narrative:      [facility] guidelines for Chronic Kidney Disease (CKD):   •  Stage 1 with normal or high GFR (GFR > 90 mL/min/1.73 square meters)  •  Stage 2 Mild CKD (GFR = 60-89 mL/min/1.73 square meters)  •  Stage 3A Moderate CKD (GFR = 45-59 mL/min/1.73 square meters)  •  Stage 3B Moderate CKD (GFR = 30-44 mL/min/1.73 square meters)  •  Stage 4 Severe CKD (GFR = 15-29 mL/min/1.73 square meters)  •  Stage 5 End Stage CKD (GFR <15 mL/min/1.73 square meters)  Note: GFR calculation is accurate only with a steady state creatinine    Hepatic function panel [[id number]]  (Abnormal) Collected: 09/11/23 2158    Lab Status: Final result Specimen: Blood from Arm, Left Updated: 09/11/23 2224     Total Bilirubin 1.12 mg/dL      Bilirubin, Direct 0.23 mg/dL      Alkaline Phosphatase 111 U/L      AST 14 U/L      ALT 22 U/L      Total Protein 7.0 g/dL      Albumin 4.0 g/dL     Protime-INR [[id number]]  (Normal) Collected: 09/11/23 2158    Lab Status: Final result Specimen: Blood from Arm, Left Updated: 09/11/23 2219     Protime 13.0 seconds      INR 0.99    APTT [[id number]]  (Normal) Collected: 09/11/23 2158    Lab Status: Final result Specimen: Blood from Arm, Left Updated: 09/11/23 2219     PTT 27 seconds     CBC and differential [[id number]]  (Abnormal) Collected: 09/11/23 2158    Lab Status: Final result Specimen: Blood from Arm, Left Updated: 09/11/23 2208     WBC 8.08 Thousand/uL      RBC 4.47 Million/uL      Hemoglobin 12.9 g/dL      Hematocrit 40.0 %      MCV 90 fL      MCH 28.9 pg      MCHC 32.3 g/dL      RDW 13.3 %      MPV 8.7 fL      Platelets 834 Thousands/uL      nRBC 0 /100 WBCs      Neutrophils Relative 76 %      Immat GRANS % 0 %      Lymphocytes Relative 14 %      Monocytes Relative 7 %      Eosinophils Relative 2 %      Basophils Relative 1 %      Neutrophils Absolute 6.18 Thousands/µL      Immature Grans Absolute 0.03 Thousand/uL      Lymphocytes Absolute 1.12 Thousands/µL      Monocytes Absolute 0.59 Thousand/µL      Eosinophils Absolute 0.12 Thousand/µL      Basophils Absolute 0.04 Thousands/µL     Lactic acid, plasma (w/reflex if result > 2.0) [[id number]]  (Normal) Collected: 09/11/23 2136    Lab Status: Final result Specimen: Blood from Arm, Left Updated: 09/11/23 2159     LACTIC ACID 1.2 mmol/L     Narrative:      Result may be elevated if tourniquet was used during collection. CT chest with contrast   Final Result by Isabel Valadez MD (09/12 0016)      1.  8.3 x 7.2 x 8.8 cm fluid collection within the left lateral breast which may represent seroma versus abscess. 2.  Diffuse skin thickening an underlying fat stranding overlying the left breast which may be due to cellulitis. 3.  Increased peripheral opacity in the left upper lobe which may be due to radiation treatment change. The study was marked in [facility] for immediate notification.       Workstation performed: [id number]                    Procedures  Procedures         ED Course  ED Course as of 09/12/23 1217   Mon Sep 11, 2023   2233 TC to Dr. Dina Wray patient's breast surgeon   [id number] Dr Sintia Manning recommends breast u/s vs CT unable to obtain u/s will proceed with CT. Patient updated with recommendations and labs. Dr. Clay Officer assumes care                               SBIRT 22yo+    Flowsheet Row Most Recent Value   Initial Alcohol Screen: US AUDIT-C     1. How often do you have a drink containing alcohol? 0 Filed at: 09/11/2023 2045   2. How many drinks containing alcohol do you have on a typical day you are drinking? 0 Filed at: 09/11/2023 2045   3b. FEMALE Any Age, or MALE 65+: How often do you have 4 or more drinks on one occassion? 0 Filed at: 09/11/2023 2045   Audit-C Score 0 Filed at: 09/11/2023 2045   DANA: How many times in the past year have you. .. Used an illegal drug or used a prescription medication for non-medical reasons? Never Filed at: 09/11/2023 2045                    Medical Decision Making  Mdm: left breast abscess vs other will obtain labs and discuss futher workup with Dr. Marychuy Borja and/or Complexity of Data Reviewed  Labs: ordered. Radiology: ordered. Risk  Prescription drug management. Disposition  Final diagnoses:   Left breast abscess     Time reflects when diagnosis was documented in both MDM as applicable and the Disposition within this note     Time User Action Codes Description Comment    9/12/2023 12:35 AM Biju Gan Add [N61.1] Left breast abscess       ED Disposition     ED Disposition   Discharge    Condition   Stable    Date/Time   Tue Sep 12, 2023 12:35 AM    Comment   Sherlyn Gilbert discharge to home/self care. Follow-up Information     Follow up With Specialties Details Why Contact Info    Colletta Brier, MD General Surgery, Surgical Oncology Schedule an appointment as soon as possible for a visit  To make appointment for reevaluation ASAP.  [id number] [facility]            Discharge Medication List as of 9/12/2023 12:38 AM      START taking these medications Details   sulfamethoxazole-trimethoprim (BACTRIM DS) 800-160 mg per tablet Take 1 tablet by mouth 2 (two) times a day for 7 days smx-tmp DS (BACTRIM) 800-160 mg tabs (1tab q12 D10), Starting Tue 9/12/2023, Until Tue 9/19/2023, Normal         CONTINUE these medications which have NOT CHANGED    Details   semaglutide, 0.25 or 0.5 mg/dose, (Ozempic, 0.25 or 0.5 MG/DOSE,) 2 mg/3 mL injection pen Inject 0.75 mL (0.5 mg total) under the skin every 7 days, Starting Mon 8/28/2023, Normal      anastrozole (ARIMIDEX) 1 mg tablet Take 1 tablet (1 mg total) by mouth daily, Starting Mon 6/19/2023, Normal      atorvastatin (LIPITOR) 40 mg tablet Take 40 mg by mouth every evening, Starting Mon 2/28/2022, Historical Med      cephalexin (KEFLEX) 500 mg capsule Take 1 capsule (500 mg total) by mouth every 6 (six) hours for 7 days, Starting Wed 9/6/2023, Until Wed 9/13/2023, Normal      Fiasp 100 UNIT/ML SOLN INJECT 120 UNITS ONCE DAILY VIA INSULIN PUMP, Historical Med      FLUoxetine (PROzac) 40 MG capsule Take 1 capsule (40 mg total) by mouth daily, Starting Tue 12/6/2022, Normal      gabapentin (Neurontin) 600 MG tablet Take 1 tablet (600 mg total) by mouth 3 (three) times a day, Starting Fri 5/19/2023, Until Thu 8/17/2023, Normal      lisdexamfetamine (VYVANSE) 30 MG capsule Take 1 capsule (30 mg total) by mouth every morning Max Daily Amount: 30 mg, Starting Tue 7/25/2023, Normal      losartan (COZAAR) 100 MG tablet Take 100 mg by mouth daily, Starting Sun 3/26/2023, Historical Med      methocarbamol (ROBAXIN) 500 mg tablet Take 1 tablet (500 mg total) by mouth 3 (three) times a day, Starting Thu 7/27/2023, Normal      metoprolol succinate (TOPROL-XL) 100 mg 24 hr tablet Take 100 mg by mouth daily, Starting Mon 2/28/2022, Historical Med             No discharge procedures on file.     PDMP Review       Value Time User    PDMP Reviewed  Yes 7/25/2023  9:49 AM Madison Lal PA-C          ED Provider  Electronically Signed by Fiona Catalan MD  09/12/23 9821 none

## 2023-09-14 ENCOUNTER — APPOINTMENT (OUTPATIENT)
Dept: ORTHOPEDIC SURGERY | Facility: CLINIC | Age: 43
End: 2023-09-14
Payer: COMMERCIAL

## 2023-09-14 VITALS
WEIGHT: 150 LBS | SYSTOLIC BLOOD PRESSURE: 120 MMHG | TEMPERATURE: 97.4 F | DIASTOLIC BLOOD PRESSURE: 78 MMHG | HEIGHT: 66 IN | BODY MASS INDEX: 24.11 KG/M2 | OXYGEN SATURATION: 99 % | HEART RATE: 67 BPM

## 2023-09-14 DIAGNOSIS — M25.531 PAIN IN RIGHT WRIST: ICD-10-CM

## 2023-09-14 PROCEDURE — 73110 X-RAY EXAM OF WRIST: CPT | Mod: RT

## 2023-09-14 PROCEDURE — 99203 OFFICE O/P NEW LOW 30 MIN: CPT

## 2023-09-14 PROCEDURE — 73130 X-RAY EXAM OF HAND: CPT | Mod: RT

## 2023-09-28 ENCOUNTER — TRANSCRIPTION ENCOUNTER (OUTPATIENT)
Age: 43
End: 2023-09-28

## 2023-10-09 ENCOUNTER — APPOINTMENT (OUTPATIENT)
Dept: SPINE | Facility: CLINIC | Age: 43
End: 2023-10-09
Payer: COMMERCIAL

## 2023-10-09 VITALS
BODY MASS INDEX: 24.11 KG/M2 | WEIGHT: 150 LBS | HEIGHT: 66 IN | SYSTOLIC BLOOD PRESSURE: 135 MMHG | HEART RATE: 81 BPM | OXYGEN SATURATION: 97 % | DIASTOLIC BLOOD PRESSURE: 90 MMHG

## 2023-10-09 DIAGNOSIS — M54.40 LUMBAGO WITH SCIATICA, UNSPECIFIED SIDE: ICD-10-CM

## 2023-10-09 DIAGNOSIS — Z86.79 PERSONAL HISTORY OF OTHER DISEASES OF THE CIRCULATORY SYSTEM: ICD-10-CM

## 2023-10-09 DIAGNOSIS — Z86.39 PERSONAL HISTORY OF OTHER ENDOCRINE, NUTRITIONAL AND METABOLIC DISEASE: ICD-10-CM

## 2023-10-09 PROCEDURE — 99213 OFFICE O/P EST LOW 20 MIN: CPT

## 2023-10-09 RX ORDER — METHOCARBAMOL 750 MG/1
750 TABLET, FILM COATED ORAL
Qty: 30 | Refills: 0 | Status: DISCONTINUED | COMMUNITY
Start: 2021-01-30 | End: 2023-10-09

## 2023-10-09 RX ORDER — TRAMADOL HYDROCHLORIDE 50 MG/1
50 TABLET, COATED ORAL
Refills: 0 | Status: DISCONTINUED | COMMUNITY
End: 2023-10-09

## 2023-10-09 RX ORDER — METHYLPREDNISOLONE 4 MG/1
4 TABLET ORAL
Qty: 1 | Refills: 0 | Status: ACTIVE | COMMUNITY
Start: 2023-10-09 | End: 1900-01-01

## 2023-10-13 ENCOUNTER — APPOINTMENT (OUTPATIENT)
Dept: MRI IMAGING | Facility: CLINIC | Age: 43
End: 2023-10-13

## 2023-11-27 ENCOUNTER — RESULT REVIEW (OUTPATIENT)
Age: 43
End: 2023-11-27

## 2023-11-27 ENCOUNTER — OUTPATIENT (OUTPATIENT)
Dept: OUTPATIENT SERVICES | Facility: HOSPITAL | Age: 43
LOS: 1 days | End: 2023-11-27
Payer: COMMERCIAL

## 2023-11-27 ENCOUNTER — APPOINTMENT (OUTPATIENT)
Dept: MRI IMAGING | Facility: CLINIC | Age: 43
End: 2023-11-27
Payer: COMMERCIAL

## 2023-11-27 ENCOUNTER — APPOINTMENT (OUTPATIENT)
Dept: RADIOLOGY | Facility: CLINIC | Age: 43
End: 2023-11-27
Payer: COMMERCIAL

## 2023-11-27 DIAGNOSIS — Z98.890 OTHER SPECIFIED POSTPROCEDURAL STATES: Chronic | ICD-10-CM

## 2023-11-27 DIAGNOSIS — M54.40 LUMBAGO WITH SCIATICA, UNSPECIFIED SIDE: ICD-10-CM

## 2023-11-27 DIAGNOSIS — M54.50 LOW BACK PAIN, UNSPECIFIED: ICD-10-CM

## 2023-11-27 PROCEDURE — 72110 X-RAY EXAM L-2 SPINE 4/>VWS: CPT | Mod: 26

## 2023-11-27 PROCEDURE — 72040 X-RAY EXAM NECK SPINE 2-3 VW: CPT | Mod: 26

## 2023-11-27 PROCEDURE — 72148 MRI LUMBAR SPINE W/O DYE: CPT

## 2023-11-27 PROCEDURE — 72148 MRI LUMBAR SPINE W/O DYE: CPT | Mod: 26

## 2023-11-27 PROCEDURE — 72110 X-RAY EXAM L-2 SPINE 4/>VWS: CPT

## 2023-11-27 PROCEDURE — 72040 X-RAY EXAM NECK SPINE 2-3 VW: CPT

## 2023-12-21 ENCOUNTER — APPOINTMENT (OUTPATIENT)
Dept: SPINE | Facility: CLINIC | Age: 43
End: 2023-12-21
Payer: COMMERCIAL

## 2023-12-21 DIAGNOSIS — M54.50 LOW BACK PAIN, UNSPECIFIED: ICD-10-CM

## 2023-12-21 DIAGNOSIS — Z98.890 OTHER SPECIFIED POSTPROCEDURAL STATES: ICD-10-CM

## 2023-12-21 PROCEDURE — 99212 OFFICE O/P EST SF 10 MIN: CPT | Mod: 95

## 2023-12-21 NOTE — DATA REVIEWED
[de-identified] : Lumbar spine from [facility] on 11/27/2023 [de-identified] : Cervical and Lumbar spine from [facility] on 11/27/2023

## 2023-12-21 NOTE — REASON FOR VISIT
[Follow-Up: _____] : a [unfilled] follow-up visit [Home] : at home, [unfilled] , at the time of the visit. [Medical Office: ([facility])___] : at the medical office located in  [Patient] : the patient [FreeTextEntry1] : low back pain.

## 2023-12-21 NOTE — HISTORY OF PRESENT ILLNESS
[FreeTextEntry1] : 43 year old female who underwent 5-6 and C6-7 total disc replacement on 1/29/2021 and recovered well. She presented on 10/9/2023 with complain of pain in the low back radiating into the bilateral mid-thigh along the lateral aspect with numbness and muscle spasms since September 2023. Denies any trauma or fall. Pain level is 6/10, aggravated with prolonged sitting and walking. She has been using gabapentin and ibuprofen as needed with no relief. Denies any weakness in lower extremities, bladder or bowel dysfunction. Lumbar spine x-rays (10/4/2023) look fine.  She is here to review MRI of lumbar spine, cervical and lumbar flexion-extension x-ray. MRI of lumbar spine does not show any disc herniation or stenosis. Cervical flexion-extension x-ray shows no change with hardware or alignment. Lumbar flexion-extension x-ray does not show any instability.

## 2023-12-21 NOTE — ASSESSMENT
[FreeTextEntry1] : 43 year old female s/p 5-6 and C6-7 total disc replacement on 1/29/2021 presenting with low back pain. MRI of lumbar spine does not show any disc herniation or stenosis. Cervical flexion-extension x-ray shows no change with hardware or alignment. Lumbar flexion-extension x-ray does not show any instability. It was recommended she follow up with PM&R for pain management

## 2024-04-08 ENCOUNTER — EMERGENCY (EMERGENCY)
Facility: HOSPITAL | Age: 44
LOS: 1 days | Discharge: HOME | End: 2024-04-08
Attending: STUDENT IN AN ORGANIZED HEALTH CARE EDUCATION/TRAINING PROGRAM | Admitting: STUDENT IN AN ORGANIZED HEALTH CARE EDUCATION/TRAINING PROGRAM
Payer: COMMERCIAL

## 2024-04-08 VITALS
RESPIRATION RATE: 17 BRPM | SYSTOLIC BLOOD PRESSURE: 134 MMHG | OXYGEN SATURATION: 100 % | HEART RATE: 59 BPM | TEMPERATURE: 98 F | DIASTOLIC BLOOD PRESSURE: 70 MMHG

## 2024-04-08 VITALS
OXYGEN SATURATION: 100 % | DIASTOLIC BLOOD PRESSURE: 87 MMHG | RESPIRATION RATE: 17 BRPM | HEART RATE: 72 BPM | SYSTOLIC BLOOD PRESSURE: 142 MMHG | TEMPERATURE: 98 F

## 2024-04-08 DIAGNOSIS — Z98.890 OTHER SPECIFIED POSTPROCEDURAL STATES: Chronic | ICD-10-CM

## 2024-04-08 LAB
ALBUMIN SERPL ELPH-MCNC: 4.8 G/DL — SIGNIFICANT CHANGE UP (ref 3.3–5)
ALP SERPL-CCNC: 51 U/L — SIGNIFICANT CHANGE UP (ref 40–120)
ALT FLD-CCNC: 14 U/L — SIGNIFICANT CHANGE UP (ref 4–33)
ANION GAP SERPL CALC-SCNC: 14 MMOL/L — SIGNIFICANT CHANGE UP (ref 7–14)
APTT BLD: 39 SEC — HIGH (ref 24.5–35.6)
AST SERPL-CCNC: 16 U/L — SIGNIFICANT CHANGE UP (ref 4–32)
BASOPHILS # BLD AUTO: 0.06 K/UL — SIGNIFICANT CHANGE UP (ref 0–0.2)
BASOPHILS NFR BLD AUTO: 1.3 % — SIGNIFICANT CHANGE UP (ref 0–2)
BILIRUB SERPL-MCNC: 0.5 MG/DL — SIGNIFICANT CHANGE UP (ref 0.2–1.2)
BUN SERPL-MCNC: 17 MG/DL — SIGNIFICANT CHANGE UP (ref 7–23)
CALCIUM SERPL-MCNC: 9.8 MG/DL — SIGNIFICANT CHANGE UP (ref 8.4–10.5)
CHLORIDE SERPL-SCNC: 106 MMOL/L — SIGNIFICANT CHANGE UP (ref 98–107)
CO2 SERPL-SCNC: 20 MMOL/L — LOW (ref 22–31)
CREAT SERPL-MCNC: 0.86 MG/DL — SIGNIFICANT CHANGE UP (ref 0.5–1.3)
EGFR: 87 ML/MIN/1.73M2 — SIGNIFICANT CHANGE UP
EOSINOPHIL # BLD AUTO: 0.02 K/UL — SIGNIFICANT CHANGE UP (ref 0–0.5)
EOSINOPHIL NFR BLD AUTO: 0.4 % — SIGNIFICANT CHANGE UP (ref 0–6)
GLUCOSE SERPL-MCNC: 91 MG/DL — SIGNIFICANT CHANGE UP (ref 70–99)
HCG SERPL-ACNC: <1 MIU/ML — SIGNIFICANT CHANGE UP
HCT VFR BLD CALC: 40.7 % — SIGNIFICANT CHANGE UP (ref 34.5–45)
HGB BLD-MCNC: 13.6 G/DL — SIGNIFICANT CHANGE UP (ref 11.5–15.5)
IANC: 2.81 K/UL — SIGNIFICANT CHANGE UP (ref 1.8–7.4)
IMM GRANULOCYTES NFR BLD AUTO: 0.2 % — SIGNIFICANT CHANGE UP (ref 0–0.9)
INR BLD: 0.94 RATIO — SIGNIFICANT CHANGE UP (ref 0.85–1.18)
LYMPHOCYTES # BLD AUTO: 1.23 K/UL — SIGNIFICANT CHANGE UP (ref 1–3.3)
LYMPHOCYTES # BLD AUTO: 27.5 % — SIGNIFICANT CHANGE UP (ref 13–44)
MCHC RBC-ENTMCNC: 30 PG — SIGNIFICANT CHANGE UP (ref 27–34)
MCHC RBC-ENTMCNC: 33.4 GM/DL — SIGNIFICANT CHANGE UP (ref 32–36)
MCV RBC AUTO: 89.8 FL — SIGNIFICANT CHANGE UP (ref 80–100)
MONOCYTES # BLD AUTO: 0.34 K/UL — SIGNIFICANT CHANGE UP (ref 0–0.9)
MONOCYTES NFR BLD AUTO: 7.6 % — SIGNIFICANT CHANGE UP (ref 2–14)
NEUTROPHILS # BLD AUTO: 2.81 K/UL — SIGNIFICANT CHANGE UP (ref 1.8–7.4)
NEUTROPHILS NFR BLD AUTO: 63 % — SIGNIFICANT CHANGE UP (ref 43–77)
NRBC # BLD: 0 /100 WBCS — SIGNIFICANT CHANGE UP (ref 0–0)
NRBC # FLD: 0 K/UL — SIGNIFICANT CHANGE UP (ref 0–0)
PLATELET # BLD AUTO: 335 K/UL — SIGNIFICANT CHANGE UP (ref 150–400)
POTASSIUM SERPL-MCNC: 5 MMOL/L — SIGNIFICANT CHANGE UP (ref 3.5–5.3)
POTASSIUM SERPL-SCNC: 5 MMOL/L — SIGNIFICANT CHANGE UP (ref 3.5–5.3)
PROT SERPL-MCNC: 8.1 G/DL — SIGNIFICANT CHANGE UP (ref 6–8.3)
PROTHROM AB SERPL-ACNC: 10.6 SEC — SIGNIFICANT CHANGE UP (ref 9.5–13)
RBC # BLD: 4.53 M/UL — SIGNIFICANT CHANGE UP (ref 3.8–5.2)
RBC # FLD: 13.2 % — SIGNIFICANT CHANGE UP (ref 10.3–14.5)
SODIUM SERPL-SCNC: 140 MMOL/L — SIGNIFICANT CHANGE UP (ref 135–145)
TROPONIN T, HIGH SENSITIVITY RESULT: <6 NG/L — SIGNIFICANT CHANGE UP
WBC # BLD: 4.47 K/UL — SIGNIFICANT CHANGE UP (ref 3.8–10.5)
WBC # FLD AUTO: 4.47 K/UL — SIGNIFICANT CHANGE UP (ref 3.8–10.5)

## 2024-04-08 PROCEDURE — 99285 EMERGENCY DEPT VISIT HI MDM: CPT

## 2024-04-08 PROCEDURE — 93010 ELECTROCARDIOGRAM REPORT: CPT

## 2024-04-08 PROCEDURE — 71046 X-RAY EXAM CHEST 2 VIEWS: CPT | Mod: 26

## 2024-04-08 RX ORDER — ONDANSETRON 8 MG/1
4 TABLET, FILM COATED ORAL ONCE
Refills: 0 | Status: DISCONTINUED | OUTPATIENT
Start: 2024-04-08 | End: 2024-04-08

## 2024-04-08 RX ORDER — FAMOTIDINE 10 MG/ML
20 INJECTION INTRAVENOUS ONCE
Refills: 0 | Status: DISCONTINUED | OUTPATIENT
Start: 2024-04-08 | End: 2024-04-08

## 2024-04-08 RX ORDER — FAMOTIDINE 10 MG/ML
20 INJECTION INTRAVENOUS ONCE
Refills: 0 | Status: COMPLETED | OUTPATIENT
Start: 2024-04-08 | End: 2024-04-08

## 2024-04-08 RX ADMIN — FAMOTIDINE 20 MILLIGRAM(S): 10 INJECTION INTRAVENOUS at 10:44

## 2024-04-08 NOTE — ED PROVIDER NOTE - CLINICAL SUMMARY MEDICAL DECISION MAKING FREE TEXT BOX
MDM: More likely esophagitis/esophageal spasm/reflux/gastritis.  However there is some concern for ACS given the character of the pain, and patient's family history in father with early cardiac disease.  Plan for basic labs, coags, EKG/troponin, chest x-ray, hCG, Pepcid.  Reassess.

## 2024-04-08 NOTE — ED PROVIDER NOTE - ATTENDING CONTRIBUTION TO CARE
Alexander Mathews, DO:  patient seen and evaluated with the Fellow I was present for key portions of the History & Physical, and I agree with the Impression & Plan. 42 yo fPMH HTN, PW left-sided chest pressure.  Patient reports due to symptoms.  Reports unknown inciting event.  Had episode at rest today which concerned her.  Sharp and maximal at onset.  Resolved spontaneously.  Associate SOB during episode.  Denies N/V, F/C, diaphoresis, cough, congestion, fatigue, history of similar symptoms.  Denies recent travel, sick contacts, leg swelling.  Patient arrives HDS well-appearing, resting tach.  EKG NSR, nonischemic, independent reviewed by me.  Will check labs, imaging, reassess.  Atypical for ACS.  PERC negative.  Do not suspect aortic catastrophe.  No indication of infectious etiology. Alexander Mathews, DO:  patient seen and evaluated with the Fellow I was present for key portions of the History & Physical, and I agree with the Impression & Plan. 44 yo fPMH HTN, PW left-sided chest pressure.  Patient reports due to symptoms.  Reports unknown inciting event.  Had episode at rest today which concerned her.  Sharp and maximal at onset.  Resolved spontaneously.  Associate SOB during episode.  Denies N/V, F/C, diaphoresis, cough, congestion, fatigue, history of similar symptoms.  Denies recent travel, sick contacts, leg swelling.  Patient arrives HDS well-appearing, resting tach.  EKG NSR, nonischemic, independent reviewed by me.  Will check labs, imaging, reassess.  Atypical for ACS.  PERC negative.  Do not suspect aortic catastrophe.  No indication of infectious etiology..

## 2024-04-08 NOTE — ED PROVIDER NOTE - PHYSICAL EXAMINATION
Physical Exam:   GENERAL: no acute distress, non-toxic appearing  HEENT: normal conjunctiva, oral mucosa moist  CARDIAC: regular rate and regular rhythm,   PULM: clear to ascultation bilaterally, sats 100% on RA, no increased work of breathing  GI: abdomen nondistended, soft, nontender  : no CVA tenderness, no suprapubic tenderness  NEURO: alert and oriented x 3, normal speech, moving all extremities without lateralization  MSK: no visible deformities, no peripheral edema, calf tenderness/redness/swelling  SKIN: no visible rashes  PSYCH: appropriate mood and affect

## 2024-04-08 NOTE — ED PROVIDER NOTE - OBJECTIVE STATEMENT
Renekirby: 43-year-old female with a prior history of hypertension (states she is not on meds, manages with exercise and diet), has a family history where her father had early cardiac disease, presents to the emergency department with an episode of squeezing/pressure-like chest pain that lasted about 10 to 15 minutes last night.  She is never felt pain like this before.  Patient was sitting on the couch while she had the chest pain.  There does not seem to be association with exertion, no associated nausea, diaphoresis, shortness of breath, palpitations, lightheadedness. Denies any radiation of the pain to shoulder/arm/neck/back. Patient denies any recent fevers or chills, URI symptoms, cough, abdominal pain, nausea or vomiting, diarrhea, dark or bloody stools, urinary symptoms, leg swelling.  Patient states she was going to try to make an appoint with cardiology though she felt this pain when she woke up however did not seem to be as severe, so she wanted to go to the emergency department to be checked.   PCP: Dr. Klein

## 2024-04-08 NOTE — ED PROVIDER NOTE - PATIENT PORTAL LINK FT
You can access the FollowMyHealth Patient Portal offered by [facility] by registering at the following website: [email or web address]. By joining [facility]’s FollowMyHealth portal, you will also be able to view your health information using other applications (apps) compatible with our system.

## 2024-04-08 NOTE — ED ADULT NURSE NOTE - CHIEF COMPLAINT QUOTE
patient c/o chest pain since last night. patient denies shortness of breath, nausea or vomiting. past medical history of HTN- states this is resolved though

## 2024-04-08 NOTE — ED ADULT NURSE NOTE - OBJECTIVE STATEMENT
received pt in bed A and OX 3 in NAD resting comfortably, c.o midsternal c/p onset last night, denies SOB, PRADHAN, dizziness.  breathing is even and unlabored, orders noted and completed.

## 2024-04-08 NOTE — ED PROVIDER NOTE - NS_EDPROVIDERDISPOUSERTYPE_ED_A_ED
Martha Kelley is calling to request a refill on the following medication(s):    Medication Request:  Requested Prescriptions     Pending Prescriptions Disp Refills    ferrous sulfate (FE TABS 325) 325 (65 Fe) MG EC tablet [Pharmacy Med Name: FERROUS SULF  MG TABLET] 90 tablet 1     Sig: take 1 tablet by mouth three times a day with meals       Last Visit Date (If Applicable):  68/34/0511    Next Visit Date:    Visit date not found Attending Attestation (For Attendings USE Only)...

## 2024-04-08 NOTE — ED PROVIDER NOTE - PROGRESS NOTE DETAILS
Gerhard: pts ekg/cxr/labs/trop reassuring. Spoke with the pt and offered CDU for tele/echo/stress though also said we'd be comfortable with her follow up as an outpatient if she could get into a Cardiologist's office within the next 2-3 days which she said she thinks she can. Patient expresses verbal understanding of return precautions. TBDC

## 2024-04-08 NOTE — ED PROVIDER NOTE - NSFOLLOWUPINSTRUCTIONS_ED_ALL_ED_FT
- Please follow up with your Cardiologist within 2-3 days. Bring your results from today.    - Please follow up with your Primary Care Doctor within 2 weeks. Bring your results from today.    - Be sure to return to the ED if you develop new, worsening, or any distressing symptoms.

## 2024-06-03 NOTE — H&P PST ADULT - NSANTHAGERD_ENT_A_CORE
Large Joint Aspiration/Injection    Date/Time: 5/29/2024 9:00 AM    Performed by: Brian Nix MD  Authorized by: Brian Nix MD    Consent Done?:  Yes (Verbal)  Indications:  Pain  Site marked: the procedure site was marked    Timeout: prior to procedure the correct patient, procedure, and site was verified    Prep: patient was prepped and draped in usual sterile fashion      Details:  Needle Size:  21 G  Approach:  Posterior  Medications:  5 mL LIDOcaine HCL 10 mg/ml (1%) 10 mg/mL (1 %); 40 mg triamcinolone acetonide 40 mg/mL  Patient tolerance:  Patient tolerated the procedure well with no immediate complications     No

## 2024-08-07 NOTE — ED CDU PROVIDER SUBSEQUENT DAY NOTE - RESPIRATORY, MLM
Breath sounds clear and equal bilaterally. impaired coordination/impaired motor control/decreased strength

## 2024-10-16 NOTE — PHYSICAL THERAPY INITIAL EVALUATION ADULT - GENERAL OBSERVATIONS, REHAB EVAL
Recvd semi supine, +IV +anterior neck dressing Bed/Stretcher in lowest position, wheels locked, appropriate side rails in place/Call bell, personal items and telephone in reach/Instruct patient to call for assistance before getting out of bed/chair/stretcher/Non-slip footwear applied when patient is off stretcher/Breaks to call system/Physically safe environment - no spills, clutter or unnecessary equipment/Purposeful proactive rounding/Room/bathroom lighting operational, light cord in reach

## 2024-11-02 NOTE — PROGRESS NOTE ADULT - SUBJECTIVE AND OBJECTIVE BOX
Routine EEG without epileptiform   S: Patient doing well. Minimal lochia. Pain controlled.    O: Vital Signs Last 24 Hrs  T(C): 36.5 (24 Oct 2018 05:49), Max: 36.7 (23 Oct 2018 15:46)  T(F): 97.7 (24 Oct 2018 05:49), Max: 98.1 (23 Oct 2018 15:46)  HR: 62 (24 Oct 2018 05:49) (53 - 65)  BP: 135/83 (24 Oct 2018 05:49) (114/62 - 148/70)  BP(mean): 99 (23 Oct 2018 14:06) (83 - 99)  RR: 17 (24 Oct 2018 05:49) (17 - 20)  SpO2: 100% (24 Oct 2018 05:49) (98% - 100%)    Gen: NAD  Abd: soft, NT, ND, fundus firm below umbilicus  Lochia: moderate  Ext: no tenderness    Labs:                        12.6   11.43 )-----------( 289      ( 23 Oct 2018 10:50 )             38.2       A: 38y PPD#1 s/p  doing well.    Plan: continue pp care  discharge 10/25/18    ADWOA Adame

## 2025-01-24 ENCOUNTER — EMERGENCY (EMERGENCY)
Facility: HOSPITAL | Age: 45
LOS: 1 days | Discharge: HOME | End: 2025-01-24
Attending: STUDENT IN AN ORGANIZED HEALTH CARE EDUCATION/TRAINING PROGRAM | Admitting: STUDENT IN AN ORGANIZED HEALTH CARE EDUCATION/TRAINING PROGRAM
Payer: COMMERCIAL

## 2025-01-24 VITALS
SYSTOLIC BLOOD PRESSURE: 166 MMHG | HEART RATE: 91 BPM | HEIGHT: 66 IN | WEIGHT: 147.05 LBS | OXYGEN SATURATION: 99 % | TEMPERATURE: 98 F | DIASTOLIC BLOOD PRESSURE: 110 MMHG | RESPIRATION RATE: 18 BRPM

## 2025-01-24 VITALS
OXYGEN SATURATION: 99 % | SYSTOLIC BLOOD PRESSURE: 124 MMHG | DIASTOLIC BLOOD PRESSURE: 83 MMHG | RESPIRATION RATE: 15 BRPM | HEART RATE: 71 BPM

## 2025-01-24 DIAGNOSIS — Z98.890 OTHER SPECIFIED POSTPROCEDURAL STATES: Chronic | ICD-10-CM

## 2025-01-24 LAB
ALBUMIN SERPL ELPH-MCNC: 4.1 G/DL — SIGNIFICANT CHANGE UP (ref 3.3–5)
ALP SERPL-CCNC: 54 U/L — SIGNIFICANT CHANGE UP (ref 40–120)
ALT FLD-CCNC: 31 U/L — SIGNIFICANT CHANGE UP (ref 10–45)
ANION GAP SERPL CALC-SCNC: 10 MMOL/L — SIGNIFICANT CHANGE UP (ref 5–17)
AST SERPL-CCNC: 18 U/L — SIGNIFICANT CHANGE UP (ref 10–40)
BASOPHILS # BLD AUTO: 0.06 K/UL — SIGNIFICANT CHANGE UP (ref 0–0.2)
BASOPHILS NFR BLD AUTO: 1.2 % — SIGNIFICANT CHANGE UP (ref 0–2)
BILIRUB SERPL-MCNC: 0.4 MG/DL — SIGNIFICANT CHANGE UP (ref 0.2–1.2)
BUN SERPL-MCNC: 21 MG/DL — SIGNIFICANT CHANGE UP (ref 7–23)
CALCIUM SERPL-MCNC: 9.5 MG/DL — SIGNIFICANT CHANGE UP (ref 8.4–10.5)
CHLORIDE SERPL-SCNC: 103 MMOL/L — SIGNIFICANT CHANGE UP (ref 96–108)
CO2 SERPL-SCNC: 27 MMOL/L — SIGNIFICANT CHANGE UP (ref 22–31)
CREAT SERPL-MCNC: 0.8 MG/DL — SIGNIFICANT CHANGE UP (ref 0.5–1.3)
EGFR: 94 ML/MIN/1.73M2 — SIGNIFICANT CHANGE UP
EOSINOPHIL # BLD AUTO: 0.02 K/UL — SIGNIFICANT CHANGE UP (ref 0–0.5)
EOSINOPHIL NFR BLD AUTO: 0.4 % — SIGNIFICANT CHANGE UP (ref 0–6)
FLUAV AG NPH QL: SIGNIFICANT CHANGE UP
FLUBV AG NPH QL: SIGNIFICANT CHANGE UP
GLUCOSE SERPL-MCNC: 96 MG/DL — SIGNIFICANT CHANGE UP (ref 70–99)
HCG SERPL-ACNC: <1 MIU/ML — SIGNIFICANT CHANGE UP
HCT VFR BLD CALC: 37.3 % — SIGNIFICANT CHANGE UP (ref 34.5–45)
HGB BLD-MCNC: 12.9 G/DL — SIGNIFICANT CHANGE UP (ref 11.5–15.5)
IMM GRANULOCYTES NFR BLD AUTO: 0.2 % — SIGNIFICANT CHANGE UP (ref 0–0.9)
LYMPHOCYTES # BLD AUTO: 0.99 K/UL — LOW (ref 1–3.3)
LYMPHOCYTES # BLD AUTO: 20 % — SIGNIFICANT CHANGE UP (ref 13–44)
MAGNESIUM SERPL-MCNC: 2 MG/DL — SIGNIFICANT CHANGE UP (ref 1.6–2.6)
MCHC RBC-ENTMCNC: 30.7 PG — SIGNIFICANT CHANGE UP (ref 27–34)
MCHC RBC-ENTMCNC: 34.6 G/DL — SIGNIFICANT CHANGE UP (ref 32–36)
MCV RBC AUTO: 88.8 FL — SIGNIFICANT CHANGE UP (ref 80–100)
MONOCYTES # BLD AUTO: 0.38 K/UL — SIGNIFICANT CHANGE UP (ref 0–0.9)
MONOCYTES NFR BLD AUTO: 7.7 % — SIGNIFICANT CHANGE UP (ref 2–14)
NEUTROPHILS # BLD AUTO: 3.49 K/UL — SIGNIFICANT CHANGE UP (ref 1.8–7.4)
NEUTROPHILS NFR BLD AUTO: 70.5 % — SIGNIFICANT CHANGE UP (ref 43–77)
NRBC # BLD: 0 /100 WBCS — SIGNIFICANT CHANGE UP (ref 0–0)
PLATELET # BLD AUTO: 361 K/UL — SIGNIFICANT CHANGE UP (ref 150–400)
POTASSIUM SERPL-MCNC: 3.9 MMOL/L — SIGNIFICANT CHANGE UP (ref 3.5–5.3)
POTASSIUM SERPL-SCNC: 3.9 MMOL/L — SIGNIFICANT CHANGE UP (ref 3.5–5.3)
PROT SERPL-MCNC: 7.4 G/DL — SIGNIFICANT CHANGE UP (ref 6–8.3)
RBC # BLD: 4.2 M/UL — SIGNIFICANT CHANGE UP (ref 3.8–5.2)
RBC # FLD: 13.5 % — SIGNIFICANT CHANGE UP (ref 10.3–14.5)
RSV RNA NPH QL NAA+NON-PROBE: SIGNIFICANT CHANGE UP
SARS-COV-2 RNA SPEC QL NAA+PROBE: SIGNIFICANT CHANGE UP
SODIUM SERPL-SCNC: 140 MMOL/L — SIGNIFICANT CHANGE UP (ref 135–145)
WBC # BLD: 4.95 K/UL — SIGNIFICANT CHANGE UP (ref 3.8–10.5)
WBC # FLD AUTO: 4.95 K/UL — SIGNIFICANT CHANGE UP (ref 3.8–10.5)

## 2025-01-24 PROCEDURE — 36415 COLL VENOUS BLD VENIPUNCTURE: CPT

## 2025-01-24 PROCEDURE — 85025 COMPLETE CBC W/AUTO DIFF WBC: CPT

## 2025-01-24 PROCEDURE — 99284 EMERGENCY DEPT VISIT MOD MDM: CPT

## 2025-01-24 PROCEDURE — 83735 ASSAY OF MAGNESIUM: CPT

## 2025-01-24 PROCEDURE — 99283 EMERGENCY DEPT VISIT LOW MDM: CPT

## 2025-01-24 PROCEDURE — 80053 COMPREHEN METABOLIC PANEL: CPT

## 2025-01-24 PROCEDURE — 87637 SARSCOV2&INF A&B&RSV AMP PRB: CPT

## 2025-01-24 PROCEDURE — 93010 ELECTROCARDIOGRAM REPORT: CPT

## 2025-01-24 PROCEDURE — 93005 ELECTROCARDIOGRAM TRACING: CPT

## 2025-01-24 PROCEDURE — 84702 CHORIONIC GONADOTROPIN TEST: CPT

## 2025-01-24 RX ORDER — SODIUM CHLORIDE 9 MG/ML
1000 INJECTION, SOLUTION INTRAMUSCULAR; INTRAVENOUS; SUBCUTANEOUS ONCE
Refills: 0 | Status: DISCONTINUED | OUTPATIENT
Start: 2025-01-24 | End: 2025-01-27

## 2025-01-24 NOTE — ED PROVIDER NOTE - PATIENT PORTAL LINK FT
You can access the FollowMyHealth Patient Portal offered by [facility] by registering at the following website: [email or web address]. By joining [employer]’s FollowMyHealth portal, you will also be able to view your health information using other applications (apps) compatible with our system.

## 2025-01-24 NOTE — ED ADULT NURSE NOTE - OBJECTIVE STATEMENT
pt reports HTN while at dermatologist, Reports dizziness, also states recent issue with BP, currently being worked up. Skin warm dry, no deficits noted.

## 2025-01-24 NOTE — ED PROVIDER NOTE - CLINICAL SUMMARY MEDICAL DECISION MAKING FREE TEXT BOX
45 yo f no sig pmh presents with dizziness. Patient admits intermittently has been having episodes of dizziness that last a few minutes and resolve on their own. Today was at her dermatologist when she felt dizzy - had her BP taken then and it was high. Admits 2 days ago similar thing happened. Is f/u with Cardiologist bc father with ho MI/CAD   Pt denies cp,. sob, difficutly breathing, vision loss, weakness  Exam as stated   Not hypertensive emergency in setting of nl exam, asymptomatic. Shared decision making - pt prefers to fu with her cardiologist prior to starting anti-hypertensive.  Patient to be discharged from ED. Any available test results were discussed with patient and/or family. Verbal instructions given, including instructions to return to ED immediately for any new, worsening, or concerning symptoms. Patient endorsed understanding. Written discharge instructions additionally given, including follow-up plan.

## 2025-01-24 NOTE — ED PROVIDER NOTE - OBJECTIVE STATEMENT
43 yo f no sig pmh presents with dizziness. Patient admits intermittently has been having episodes of dizziness that last a few minutes and resolve on their own. Today was at her dermatologist when she felt dizzy - had her BP taken then and it was high. Admits 2 days ago similar thing happened. Is f/u with Cardiologist bc father with ho MI/CAD

## 2025-01-24 NOTE — ED ADULT TRIAGE NOTE - CHIEF COMPLAINT QUOTE
Pt c/o feeling dizzy which started at her dermatology appointment and high BP. Denies any headaches, weakness, slurred speech, facial droop. States she had the same thing happen to her on Sunday.

## 2025-01-24 NOTE — ED PROVIDER NOTE - NSFOLLOWUPINSTRUCTIONS_ED_ALL_ED_FT
Dizziness    WHAT YOU NEED TO KNOW:    Dizziness is a feeling of being off balance or unsteady. Common causes of dizziness are an inner ear fluid imbalance or a lack of oxygen in your blood. Dizziness may be acute (lasts 3 days or less) or chronic (lasts longer than 3 days). You may have dizzy spells that last from seconds to a few hours.     DISCHARGE INSTRUCTIONS:    Return to the emergency department if:     You have a headache and a stiff neck.      You have shaking chills and a fever.       You vomit over and over with no relief.       Your vomit or bowel movements are red or black.       You have pain in your chest, back, or abdomen.       You have numbness, especially in your face, arms, or legs.       You have trouble moving your arms or legs.       You are confused.     Contact your healthcare provider if:     You have a fever.       Your symptoms do not get better with treatment.       You have questions or concerns about your condition or care.     Manage your symptoms:     Do not drive or operate heavy machinery when you are dizzy.       Get up slowly from sitting or lying down.       Drink plenty of liquids. Liquids help prevent dehydration. Ask how much liquid to drink each day and which liquids are best for you.    Follow up with your healthcare provider as directed: Write down your questions so you remember to ask them during your visits.        © Copyright [employer] 2019 All illustrations and images included in CareNotes are the copyrighted property of [employer] or [employer].

## 2025-01-24 NOTE — ED PROVIDER NOTE - PHYSICAL EXAMINATION
CONSTITUTIONAL: NAD  SKIN: Warm dry  HEAD: NCAT  EYES: NL inspection  ENT: MMM  NECK: Supple  CARD: RRR  RESP: Unlabored respirations  ABD: S/NT no R/G  EXT: no pedal edema  NEURO: Grossly unremarkable, CN 2-12 grossly intact, gait not ataxic, negative romberg, negative finger to nose, sensation and motor strength UE/LE wnl   PSYCH: Cooperative, appropriate.

## 2025-01-31 ENCOUNTER — OUTPATIENT (OUTPATIENT)
Dept: OUTPATIENT SERVICES | Facility: HOSPITAL | Age: 45
LOS: 1 days | End: 2025-01-31
Payer: COMMERCIAL

## 2025-01-31 ENCOUNTER — APPOINTMENT (OUTPATIENT)
Dept: CT IMAGING | Facility: CLINIC | Age: 45
End: 2025-01-31

## 2025-01-31 DIAGNOSIS — Z98.890 OTHER SPECIFIED POSTPROCEDURAL STATES: Chronic | ICD-10-CM

## 2025-01-31 PROBLEM — Z78.9 OTHER SPECIFIED HEALTH STATUS: Chronic | Status: ACTIVE | Noted: 2025-01-24

## 2025-01-31 PROCEDURE — 75571 CT HRT W/O DYE W/CA TEST: CPT

## 2025-01-31 PROCEDURE — 75571 CT HRT W/O DYE W/CA TEST: CPT | Mod: 26

## 2025-06-30 ENCOUNTER — APPOINTMENT (OUTPATIENT)
Dept: ULTRASOUND IMAGING | Facility: CLINIC | Age: 45
End: 2025-06-30
Payer: COMMERCIAL

## 2025-06-30 ENCOUNTER — APPOINTMENT (OUTPATIENT)
Dept: MAMMOGRAPHY | Facility: CLINIC | Age: 45
End: 2025-06-30
Payer: COMMERCIAL

## 2025-06-30 PROCEDURE — 77063 BREAST TOMOSYNTHESIS BI: CPT

## 2025-06-30 PROCEDURE — 76641 ULTRASOUND BREAST COMPLETE: CPT | Mod: 50

## 2025-06-30 PROCEDURE — 77067 SCR MAMMO BI INCL CAD: CPT
